# Patient Record
Sex: MALE | Race: WHITE | Employment: OTHER | URBAN - METROPOLITAN AREA
[De-identification: names, ages, dates, MRNs, and addresses within clinical notes are randomized per-mention and may not be internally consistent; named-entity substitution may affect disease eponyms.]

---

## 2017-01-18 DIAGNOSIS — I25.10 ATHEROSCLEROSIS OF NATIVE CORONARY ARTERY WITHOUT ANGINA PECTORIS, UNSPECIFIED WHETHER NATIVE OR TRANSPLANTED HEART: Primary | ICD-10-CM

## 2017-01-18 NOTE — TELEPHONE ENCOUNTER
Requested Prescriptions     Pending Prescriptions Disp Refills    nitroglycerin (NITROSTAT) 0.4 mg SL tablet 25 Tab 0     Si Tab by SubLINGual route every five (5) minutes as needed for up to 3 doses.      Please sign

## 2017-01-19 ENCOUNTER — OFFICE VISIT (OUTPATIENT)
Dept: CARDIOLOGY CLINIC | Age: 67
End: 2017-01-19

## 2017-01-19 VITALS
DIASTOLIC BLOOD PRESSURE: 77 MMHG | HEIGHT: 74 IN | BODY MASS INDEX: 27.72 KG/M2 | HEART RATE: 78 BPM | WEIGHT: 216 LBS | SYSTOLIC BLOOD PRESSURE: 169 MMHG

## 2017-01-19 DIAGNOSIS — I25.119 ATHEROSCLEROSIS OF NATIVE CORONARY ARTERY OF NATIVE HEART WITH ANGINA PECTORIS (HCC): Primary | ICD-10-CM

## 2017-01-19 DIAGNOSIS — I10 ESSENTIAL HYPERTENSION, BENIGN: ICD-10-CM

## 2017-01-19 DIAGNOSIS — I70.213 ATHEROSCLEROSIS OF NATIVE ARTERY OF BOTH LOWER EXTREMITIES WITH INTERMITTENT CLAUDICATION (HCC): ICD-10-CM

## 2017-01-19 DIAGNOSIS — I65.09 VERTEBRAL ARTERY STENOSIS, UNSPECIFIED LATERALITY: ICD-10-CM

## 2017-01-19 DIAGNOSIS — I95.1 ORTHOSTATIC HYPOTENSION: ICD-10-CM

## 2017-01-19 DIAGNOSIS — Z98.61 POSTSURGICAL PERCUTANEOUS TRANSLUMINAL CORONARY ANGIOPLASTY STATUS: ICD-10-CM

## 2017-01-19 DIAGNOSIS — E78.5 HYPERLIPIDEMIA, UNSPECIFIED HYPERLIPIDEMIA TYPE: ICD-10-CM

## 2017-01-19 RX ORDER — SERTRALINE HYDROCHLORIDE 50 MG/1
TABLET, FILM COATED ORAL 2 TIMES DAILY
COMMUNITY

## 2017-01-19 RX ORDER — ACETAMINOPHEN 325 MG/1
650 TABLET ORAL
COMMUNITY

## 2017-01-19 RX ORDER — NITROGLYCERIN 0.4 MG/1
0.4 TABLET SUBLINGUAL
Qty: 25 TAB | Refills: 1 | Status: SHIPPED | OUTPATIENT
Start: 2017-01-19 | End: 2017-04-27 | Stop reason: SDUPTHER

## 2017-01-19 RX ORDER — MIRTAZAPINE 30 MG/1
30 TABLET, FILM COATED ORAL
COMMUNITY

## 2017-01-19 RX ORDER — NITROGLYCERIN 0.4 MG/1
0.4 TABLET SUBLINGUAL
Qty: 25 TAB | Refills: 0 | OUTPATIENT
Start: 2017-01-19

## 2017-01-19 NOTE — MR AVS SNAPSHOT
Visit Information Date & Time Provider Department Dept. Phone Encounter #  
 1/19/2017 10:00 AM Trudy Martinez MD [facility] [phone] Follow-up Instructions Return for F/u after tests. Your Appointments 1/31/2017  8:30 AM  
PROCEDURE with [place] NUC [facility] ([facility]) Appt Note: Kirstin/Echo/Harris [address] [facility] [address]  
  
   
 [address] [id number] [address] [address]  
  
    
 1/31/2017  8:45 AM  
ECHO CARDIOGRAMS 2D with [place] ECHO [facility] ([facility]) Appt Note: [facility] [address] [place] [address] [address]  
  
   
 [address] [facility]  
  
    
 2/3/2017  1:30 PM  
Follow Up with Trudy Martinez MD  
[facility] ([facility]) Appt Note: Post nuclear/echo/Arterial doppler at [facility] [address] [facility] [address]  
  
   
 [address] [id number] [address] [address] Upcoming Health Maintenance Date Due Hepatitis C Screening 1950 FOBT Q 1 YEAR AGE 50-75 7/7/2000 ZOSTER VACCINE AGE 60> 7/7/2010 GLAUCOMA SCREENING Q2Y 7/7/2015 Pneumococcal 65+ Low/Medium Risk (1 of 2 - PCV13) 7/7/2015 MEDICARE YEARLY EXAM 7/7/2015 INFLUENZA AGE 9 TO ADULT 8/1/2016 DTaP/Tdap/Td series (2 - Td) 9/6/2022 Allergies as of 1/19/2017  Review Complete On: 1/19/2017 By: Trudy Martinez MD  
  
 Severity Noted Reaction Type Reactions Codeine  09/06/2012    Other (comments) States keeps him awake Current Immunizations  Never Reviewed Name Date TDAP Vaccine 9/6/2012 11:06 AM  
  
 Not reviewed this visit You Were Diagnosed With   
  
 Codes Comments Atherosclerosis of native coronary artery of native heart with angina pectoris ([facility] [address]    -  Primary ICD-10-CM: I25.119 ICD-9-CM: 414.01, 413.9 stable 
exertional angina Essential hypertension, benign     ICD-10-CM: I10 
ICD-9-CM: 401.1 elevated 
has orthostatic hypotension Postsurgical percutaneous transluminal coronary angioplasty status     ICD-10-CM: Z98.61 ICD-9-CM: V45.82 Vertebral artery stenosis, unspecified laterality     ICD-10-CM: I65.09 
ICD-9-CM: 433.20 Hyperlipidemia, unspecified hyperlipidemia type     ICD-10-CM: E78.5 ICD-9-CM: 272.4 stable Orthostatic hypotension     ICD-10-CM: I95.1 ICD-9-CM: 458.0 Atherosclerosis of native artery of both lower extremities with intermittent claudication ([facility] [address]     ICD-10-CM: J34.676 ICD-9-CM: 440.21 Vitals BP Pulse Height(growth percentile) Weight(growth percentile) BMI Smoking Status 169/77 78 6' 2\" (1.88 m) 216 lb (98 kg) 27.73 kg/m2 Former Smoker Vitals History BMI and BSA Data Body Mass Index Body Surface Area  
 27.73 kg/m 2 2.26 m 2 Preferred Pharmacy Pharmacy Name Phone [facility] [address] [place] [address] AT [facility] [phone] Your Updated Medication List  
  
   
This list is accurate as of: 1/19/17 10:33 AM.  Always use your most recent med list.  
  
  
  
  
 acetaminophen 325 mg tablet Commonly known as:  TYLENOL Take  by mouth every four (4) hours as needed for Pain. amantadine HCl 100 mg capsule Commonly known as:  Vicki Vernell Take  by mouth two (2) times a day. aspirin delayed-release 81 mg tablet Take  by mouth daily. ATIVAN 1 mg tablet Generic drug:  LORazepam  
Take 2 mg by mouth every eight (8) hours as needed. atorvastatin 80 mg tablet Commonly known as:  LIPITOR Take 80 mg by mouth daily. AVODART 0.5 mg capsule Generic drug:  dutasteride Take 0.5 mg by mouth daily. carbidopa-levodopa-entacapone 37.5-150-200 mg tablet Commonly known as:  STALEVO Take 1 Tab by mouth four (4) times daily. clopidogrel 75 mg Tab Commonly known as:  PLAVIX Take 1 Tab by mouth daily. Comp. Stocking,Thigh,Reg,Medium Misc  
1 Units by Does Not Apply route daily. fludrocortisone 0.1 mg tablet Commonly known as:  FLORINEF Take  by mouth daily. NEURONTIN 300 mg capsule Generic drug:  gabapentin Take 300 mg by mouth three (3) times daily. * nitroglycerin 0.4 mg SL tablet Commonly known as:  NITROSTAT  
1 Tab by SubLINGual route every five (5) minutes as needed for up to 3 doses. * nitroglycerin 0.4 mg SL tablet Commonly known as:  NITROSTAT  
1 Tab by SubLINGual route every five (5) minutes as needed for Chest Pain. REMERON 30 mg tablet Generic drug:  mirtazapine Take  by mouth nightly. ZOLOFT 50 mg tablet Generic drug:  sertraline Take  by mouth daily. * Notice: This list has 2 medication(s) that are the same as other medications prescribed for you. Read the directions carefully, and ask your doctor or other care provider to review them with you. Prescriptions Sent to Pharmacy Refills  
 nitroglycerin (NITROSTAT) 0.4 mg SL tablet 1 Si Tab by SubLINGual route every five (5) minutes as needed for Chest Pain. Class: Normal  
 Pharmacy: [facility] [address] [place] [address] [address] Ph #: [phone] Route: SubLINGual  
  
We Performed the Following DUPLEX LO EXTREM ART BILAT [id number] CPT(R)] Follow-up Instructions Return for F/u after tests. To-Do List   
 2017 Cardiac Services:  2D ECHO COMPLETE ADULT (TTE)   
  
 2017 Nursing:  SCHEDULE NUCLEAR STUDY Patient Instructions Patient is scheduled for a Art Doppler at [facility] on 17 @ 12:30 Introducing [facility]! Abdirashid Caldwell introduces [employer] patient portal. Now you can access parts of your medical record, email your doctor's office, and request medication refills online. 1. In your internet browser, go to [email or web address] 2. Click on the First Time User? Click Here link in the Sign In box. You will see the New Member Sign Up page. 3. Enter your [employer] Access Code exactly as it appears below. You will not need to use this code after youve completed the sign-up process. If you do not sign up before the expiration date, you must request a new code. · [employer] Access Code: [email or web address] Expires: 4/19/2017  9:49 AM 
 
4. Enter the last four digits of your Social Security Number (xxxx) and Date of Birth (mm/dd/yyyy) as indicated and click Submit. You will be taken to the next sign-up page. 5. Create a [employer] ID. This will be your [employer] login ID and cannot be changed, so think of one that is secure and easy to remember. 6. Create a [employer] password. You can change your password at any time. 7. Enter your Password Reset Question and Answer. This can be used at a later time if you forget your password. 8. Enter your e-mail address. You will receive e-mail notification when new information is available in [address] 9. Click Sign Up. You can now view and download portions of your medical record. 10. Click the Download Summary menu link to download a portable copy of your medical information. If you have questions, please visit the Frequently Asked Questions section of the [employer] website. Remember, [employer] is NOT to be used for urgent needs. For medical emergencies, dial 911. Now available from your iPhone and Android! Please provide this summary of care documentation to your next provider. Your primary care clinician is listed as [facility] ALLEN. If you have any questions after today's visit, please call [phone].

## 2017-01-19 NOTE — PROGRESS NOTES
1. Have you been to the ER, urgent care clinic since your last visit? Hospitalized since your last visit? No    2. Have you seen or consulted any other health care providers outside of the [facility] since your last visit? Include any pap smears or colon screening. Yes Where: /Dr. Charlotte Phan     3. Since your last visit, have you had any of the following symptoms? chest pains, shortness of breath and dizziness. 4.  Have you had any blood work, X-rays or cardiac testing? No       5. Where do you normally have your labs drawn? 6. Do you need any refills today?   no

## 2017-01-19 NOTE — PROGRESS NOTES
HISTORY OF PRESENT ILLNESS  Aaron Mena is a 77 y.o. male. HPI Comments: Patient with cad,old pci,hyperlipidemia. On follow up patient denies any chest pains, palpitation or other significant symptoms. He has sob on exertion. Improving    Chest Pain (Angina)    The history is provided by the patient. This is a new problem. The current episode started more than 1 week ago. The problem has not changed since onset. The problem occurs every several days. The pain is associated with exertion. The pain is present in the substernal region. The pain is mild. The quality of the pain is described as pressure-like. The pain does not radiate. Associated symptoms include dizziness and shortness of breath. Pertinent negatives include no abdominal pain, no claudication, no cough, no fever, no headaches, no hemoptysis, no nausea, no orthopnea, no palpitations, no PND, no sputum production, no vomiting and no weakness. Hypertension   The history is provided by the patient. This is a chronic problem. The problem occurs constantly. The problem has not changed since onset. Associated symptoms include chest pain and shortness of breath. Pertinent negatives include no abdominal pain and no headaches. Cholesterol Problem   The history is provided by the patient. This is a chronic problem. The problem occurs constantly. The problem has not changed since onset. Associated symptoms include chest pain and shortness of breath. Pertinent negatives include no abdominal pain and no headaches. Shortness of Breath   The history is provided by the patient. This is a chronic problem. The problem occurs intermittently. The problem has been gradually improving. Associated symptoms include chest pain. Pertinent negatives include no fever, no headaches, no cough, no sputum production, no hemoptysis, no wheezing, no PND, no orthopnea, no vomiting, no abdominal pain, no rash, no leg swelling and no claudication.    Dizziness   The history is provided by the patient. This is a chronic problem. The problem occurs every several days. The problem has been gradually improving. Associated symptoms include chest pain and shortness of breath. Pertinent negatives include no abdominal pain and no headaches. Review of Systems   Constitutional: Negative for chills and fever. HENT: Negative for nosebleeds. Eyes: Negative for blurred vision and double vision. Respiratory: Positive for shortness of breath. Negative for cough, hemoptysis, sputum production and wheezing. Cardiovascular: Positive for chest pain. Negative for palpitations, orthopnea, claudication, leg swelling and PND. Gastrointestinal: Negative for abdominal pain, heartburn, nausea and vomiting. Musculoskeletal: Negative for myalgias. Skin: Negative for rash. Neurological: Positive for dizziness, tingling and loss of consciousness. Negative for weakness and headaches. Endo/Heme/Allergies: Does not bruise/bleed easily. Family History   Problem Relation Age of Onset    Heart Disease Other        Past Medical History   Diagnosis Date    CAD (coronary artery disease)     Coronary atherosclerosis of native coronary artery 3/7/2014     stable  old pci sob on exertion. r/o ischemia or cmp     Essential hypertension, benign 3/7/2014     old pci's     Hypotension, unspecified 3/7/2014     old pci's     Other and unspecified hyperlipidemia 3/7/2014     old pci's     Parkinson disease ([facility] [address] 3/7/2014     old pci's     Parkinson's disease ([facility] [address]     Postsurgical percutaneous transluminal coronary angioplasty status 3/7/2014     old pci's        Past Surgical History   Procedure Laterality Date    Pr cardiac surg procedure unlist       stents    Hx other surgical       deep brain stimulator    Hx hernia repair Left 12/3/13     Riblet    Hx cervical fusion         Social History   Substance Use Topics    Smoking status: Former Smoker     Quit date: 2/8/2016    Smokeless tobacco: Never Used    Alcohol use Yes      Comment: occasional       Allergies   Allergen Reactions    Codeine Other (comments)     States keeps him awake       Current Outpatient Prescriptions   Medication Sig    mirtazapine (REMERON) 30 mg tablet Take  by mouth nightly.  acetaminophen (TYLENOL) 325 mg tablet Take  by mouth every four (4) hours as needed for Pain.  sertraline (ZOLOFT) 50 mg tablet Take  by mouth daily.  nitroglycerin (NITROSTAT) 0.4 mg SL tablet 1 Tab by SubLINGual route every five (5) minutes as needed for Chest Pain.  clopidogrel (PLAVIX) 75 mg tablet Take 1 Tab by mouth daily.  Comp. Stocking,Thigh,Reg,Medium misc 1 Units by Does Not Apply route daily.  nitroglycerin (NITROSTAT) 0.4 mg SL tablet 1 Tab by SubLINGual route every five (5) minutes as needed for up to 3 doses.  gabapentin (NEURONTIN) 300 mg capsule Take 300 mg by mouth three (3) times daily.  dutasteride (AVODART) 0.5 mg capsule Take 0.5 mg by mouth daily.  aspirin delayed-release 81 mg tablet Take  by mouth daily.  amantadine HCl (SYMMETREL) 100 mg capsule Take  by mouth two (2) times a day.  fludrocortisone (FLORINEF) 0.1 mg tablet Take  by mouth daily.  atorvastatin (LIPITOR) 80 mg tablet Take 80 mg by mouth daily.  LORazepam (ATIVAN) 1 mg tablet Take 2 mg by mouth every eight (8) hours as needed.  carbidopa-levodopa-entacapone (STALEVO) 37.5-150-200 mg tablet Take 1 Tab by mouth four (4) times daily. No current facility-administered medications for this visit. Visit Vitals    /77    Pulse 78    Ht 6' 2\" (1.88 m)    Wt 98 kg (216 lb)    BMI 27.73 kg/m2       Physical Exam   Constitutional: He is oriented to person, place, and time. He appears well-developed and well-nourished. HENT:   Head: Normocephalic and atraumatic. Eyes: Conjunctivae are normal.   Neck: Neck supple. No JVD present. No tracheal deviation present. No thyromegaly present.    Cardiovascular: Normal rate, regular rhythm and normal heart sounds. Exam reveals no gallop and no friction rub. No murmur heard. Pulmonary/Chest: Breath sounds normal. No respiratory distress. He has no wheezes. He has no rales. He exhibits no tenderness. Brain stimulator generator on rt side   Abdominal: Soft. There is no tenderness. Musculoskeletal: He exhibits no edema. Neurological: He is alert and oriented to person, place, and time. Skin: Skin is warm and dry. Psychiatric: He has a normal mood and affect. Mr. Jocelyne Guerrero has a reminder for a \"due or due soon\" health maintenance. I have asked that he contact his primary care provider for follow-up on this health maintenance. NUCLEAR IMAGING: 3/2014  Findings:   1. Post-stress imaging in short axis, horizontal and vertical long axis views reveals a mild defect in isotope uptake along inferobasal wall. 2. Resting images also show persistent defect in inferobasal wall without any normalization. 3. Gated images show normal left ventricular size, wall motion and systolic function. Ejection fraction is 50%. Diagnosis:   1. Probably normal scan. 2. Evidence of mild fixed defect of inferobasal wall noted with normal wall motion, likely suggestive of tissue attenuation. 3.   SUMMARY:echo:3/2014  Left ventricle: The ventricle was mildly dilated. Ejection fraction was  estimated to be 55 %. No obvious wall motion abnormalities identified in  the views obtained. There was mild concentric hypertrophy. Features were  consistent with a pseudonormal left ventricular filling pattern, with  concomitant abnormal relaxation and increased filling pressure (grade 2  diastolic dysfunction). Impression:2016    1. Extensive atherosclerotic vascular disease. High grade stenoses (greater than 75%) are suggested at the origins of both vertebral arteries.     2.  Heterogeneous plaque involving both carotid bifurcations with moderate stenoses involving both internal carotid artery origins, as described above. 3.  High-grade stenoses involving the right carotid siphon with moderate to high-grade stenoses in the left carotid siphon. 4.  Probable high-grade stenosis involving the distal P2 segment of the left posterior cerebral artery. Assessment       ICD-10-CM ICD-9-CM    1. Atherosclerosis of native coronary artery of native heart with angina pectoris (HCC) I25.119 414.01 SCHEDULE NUCLEAR STUDY     413.9 2D ECHO COMPLETE ADULT (TTE)    stable  exertional angina   2. Essential hypertension, benign I10 401.1     elevated  has orthostatic hypotension   3. Postsurgical percutaneous transluminal coronary angioplasty status Z98.61 V45.82    4. Vertebral artery stenosis, unspecified laterality I65.09 433.20    5. Hyperlipidemia, unspecified hyperlipidemia type E78.5 272.4     stable   6. Orthostatic hypotension I95.1 458.0    7. Atherosclerosis of native artery of both lower extremities with intermittent claudication (HCC) I70.213 440.21 DUPLEX LO EXTREM ART BILAT   will keep bp on high side due to frequent drop-on florinef-feels better    Medications Discontinued During This Encounter   Medication Reason    temazepam (RESTORIL) 30 mg capsule Not A Current Medication    venlafaxine-SR (EFFEXOR XR) 75 mg capsule Not A Current Medication    tamsulosin (FLOMAX) 0.4 mg capsule Not A Current Medication       Orders Placed This Encounter    DUPLEX LO EXTREM ART BILAT     Order Specific Question:   Reason for Exam:     Answer:   leg cramp    SCHEDULE NUCLEAR STUDY     lexiscan stress test     Standing Status:   Future     Standing Expiration Date:   2018    2D ECHO COMPLETE ADULT (TTE)     Standing Status:   Future     Standing Expiration Date:   2017     Order Specific Question:   Reason for Exam:     Answer:   see diagnosis    nitroglycerin (NITROSTAT) 0.4 mg SL tablet     Si Tab by SubLINGual route every five (5) minutes as needed for Chest Pain.      Dispense:  25 Tab Refill:  1       Follow-up Disposition:  Return for F/u after tests.

## 2017-01-27 ENCOUNTER — TELEPHONE (OUTPATIENT)
Dept: CARDIOLOGY CLINIC | Age: 67
End: 2017-01-27

## 2017-01-27 NOTE — TELEPHONE ENCOUNTER
Patient wife states he just got out of hospital d/t low blood pressure, and he was suppose to have a test done at hospital yesterday and had to cancel it. She want to know if he should reschedule his stress test and echo on Tuesday. She states BP is still running 154/98 and as low as 50/ 30 that's why he went to ER she states.  Please Advise

## 2017-01-30 NOTE — TELEPHONE ENCOUNTER
Patient schedule for echo/ stress test tomorrow do you want a same day follow up after tests or reschedule for another day.

## 2017-01-31 ENCOUNTER — OFFICE VISIT (OUTPATIENT)
Dept: CARDIOLOGY CLINIC | Age: 67
End: 2017-01-31

## 2017-01-31 ENCOUNTER — CLINICAL SUPPORT (OUTPATIENT)
Dept: CARDIOLOGY CLINIC | Age: 67
End: 2017-01-31

## 2017-01-31 VITALS
BODY MASS INDEX: 27.59 KG/M2 | DIASTOLIC BLOOD PRESSURE: 49 MMHG | HEIGHT: 74 IN | WEIGHT: 215 LBS | HEART RATE: 85 BPM | SYSTOLIC BLOOD PRESSURE: 105 MMHG

## 2017-01-31 DIAGNOSIS — I25.119 ATHEROSCLEROSIS OF NATIVE CORONARY ARTERY OF NATIVE HEART WITH ANGINA PECTORIS (HCC): ICD-10-CM

## 2017-01-31 DIAGNOSIS — R55 SYNCOPE, UNSPECIFIED SYNCOPE TYPE: ICD-10-CM

## 2017-01-31 DIAGNOSIS — I25.119 ATHEROSCLEROSIS OF NATIVE CORONARY ARTERY OF NATIVE HEART WITH ANGINA PECTORIS (HCC): Primary | ICD-10-CM

## 2017-01-31 DIAGNOSIS — Z98.61 POSTSURGICAL PERCUTANEOUS TRANSLUMINAL CORONARY ANGIOPLASTY STATUS: ICD-10-CM

## 2017-01-31 DIAGNOSIS — I10 ESSENTIAL HYPERTENSION, BENIGN: ICD-10-CM

## 2017-01-31 RX ORDER — TAMSULOSIN HYDROCHLORIDE 0.4 MG/1
0.4 CAPSULE ORAL DAILY
COMMUNITY
End: 2017-04-27

## 2017-01-31 NOTE — PROGRESS NOTES
1. Have you been to the ER, urgent care clinic since your last visit? Hospitalized since your last visit? Yes Where: [facility] Reason for visit: Syncope    2. Have you seen or consulted any other health care providers outside of the [address] [place] [address] since your last visit? Include any pap smears or colon screening. No     3. Since your last visit, have you had any of the following symptoms? .           4. Have you had any blood work, X-rays or cardiac testing? Yes Where: [facility]             5.  Where do you normally have your labs drawn? [facility]    6. Do you need any refills today?    No

## 2017-01-31 NOTE — MR AVS SNAPSHOT
Visit Information Date & Time Provider Department Dept. Phone Encounter #  
 1/31/2017 11:15 AM Christian Ortega MD [facility] [phone] [id number] Follow-up Instructions Return in about 3 months (around 4/30/2017). Your Appointments 4/27/2017  9:30 AM  
Follow Up with Christian Ortgea MD  
[facility] ([facility]) Appt Note: 3 month [address] [place] [address]  
  
   
 [address] [place] [address] Upcoming Health Maintenance Date Due Hepatitis C Screening 1950 FOBT Q 1 YEAR AGE 50-75 7/7/2000 ZOSTER VACCINE AGE 60> 7/7/2010 GLAUCOMA SCREENING Q2Y 7/7/2015 Pneumococcal 65+ Low/Medium Risk (1 of 2 - PCV13) 7/7/2015 MEDICARE YEARLY EXAM 7/7/2015 INFLUENZA AGE 9 TO ADULT 8/1/2016 DTaP/Tdap/Td series (2 - Td) 9/6/2022 Allergies as of 1/31/2017  Review Complete On: 1/31/2017 By: Christian Ortega MD  
  
 Severity Noted Reaction Type Reactions Codeine  09/06/2012    Other (comments) States keeps him awake Current Immunizations  Never Reviewed Name Date TDAP Vaccine 9/6/2012 11:06 AM  
  
 Not reviewed this visit You Were Diagnosed With   
  
 Codes Comments Atherosclerosis of native coronary artery of native heart with angina pectoris ([facility]    -  Primary ICD-10-CM: I25.119 ICD-9-CM: 414.01, 413.9 stable 
negative stress test  
 Essential hypertension, benign     ICD-10-CM: I10 
ICD-9-CM: 401.1 Postsurgical percutaneous transluminal coronary angioplasty status     ICD-10-CM: Z98.61 ICD-9-CM: V45.82 Syncope, unspecified syncope type     ICD-10-CM: R55 
ICD-9-CM: 780.2 recent admission 
new intracraniel arterial block 
awaiting interventional neuro evaluation with dr Gabriela Lemas BP Pulse Height(growth percentile) Weight(growth percentile) BMI Smoking Status 105/49 85 6' 2\" (1.88 m) 215 lb (97.5 kg) 27.6 kg/m2 Former Smoker Vitals History BMI and BSA Data Body Mass Index Body Surface Area  
 27.6 kg/m 2 2.26 m 2 Preferred Pharmacy Pharmacy Name Phone [facility] [address] [facility] AT [facility] [phone] Your Updated Medication List  
  
   
This list is accurate as of: 1/31/17 11:37 AM.  Always use your most recent med list.  
  
  
  
  
 acetaminophen 325 mg tablet Commonly known as:  TYLENOL Take  by mouth every four (4) hours as needed for Pain. amantadine HCl 100 mg capsule Commonly known as:  Behzad Mates Take  by mouth two (2) times a day. aspirin delayed-release 81 mg tablet Take  by mouth daily. ATIVAN 1 mg tablet Generic drug:  LORazepam  
Take 2 mg by mouth every eight (8) hours as needed. atorvastatin 80 mg tablet Commonly known as:  LIPITOR Take 40 mg by mouth daily. AVODART 0.5 mg capsule Generic drug:  dutasteride Take 0.5 mg by mouth daily. carbidopa-levodopa-entacapone 37.5-150-200 mg tablet Commonly known as:  STALEVO Take 1 Tab by mouth four (4) times daily. -200  
  
 clopidogrel 75 mg Tab Commonly known as:  PLAVIX Take 1 Tab by mouth daily. Comp. Stocking,Thigh,Reg,Medium Misc  
1 Units by Does Not Apply route daily. fludrocortisone 0.1 mg tablet Commonly known as:  FLORINEF Take  by mouth daily. NEURONTIN 300 mg capsule Generic drug:  gabapentin Take 300 mg by mouth three (3) times daily. nitroglycerin 0.4 mg SL tablet Commonly known as:  NITROSTAT  
1 Tab by SubLINGual route every five (5) minutes as needed for Chest Pain.  
  
 regadenoson 0.4 mg/5 mL Syrg injection Commonly known as:  LEXISCAN  
5 mL by IntraVENous route once for 1 dose. REMERON 30 mg tablet Generic drug:  mirtazapine Take  by mouth nightly. tamsulosin 0.4 mg capsule Commonly known as:  FLOMAX Take 0.4 mg by mouth daily. ZOLOFT 50 mg tablet Generic drug:  sertraline Take  by mouth daily. Prescriptions Printed Refills Comp. Stocking,Thigh,Reg,Medium misc 6 Si Units by Does Not Apply route daily. Class: Print Route: Does Not Apply Follow-up Instructions Return in about 3 months (around 2017). Introducing [facility]! [facility] introduces "[employer]" patient portal. Now you can access parts of your medical record, email your doctor's office, and request medication refills online. 1. In your internet browser, go to [email or web address] ([facility]). [employer]/[facility] ([facility]) 2. Click on the First Time User? Click Here link in the Sign In box. You will see the New Member Sign Up page. 3. Enter your "[employer]" Access Code exactly as it appears below. You will not need to use this code after youve completed the sign-up process. If you do not sign up before the expiration date, you must request a new code. · "[employer]" Access Code: [email or web address] Expires: 2017  9:49 AM 
 
4. Enter the last four digits of your Social Security Number (xxxx) and Date of Birth (mm/dd/yyyy) as indicated and click Submit. You will be taken to the next sign-up page. 5. Create a "[employer]" ID. This will be your "[employer]" login ID and cannot be changed, so think of one that is secure and easy to remember. 6. Create a "[employer]" password. You can change your password at any time. 7. Enter your Password Reset Question and Answer. This can be used at a later time if you forget your password. 8. Enter your e-mail address. You will receive e-mail notification when new information is available in [address] 9. Click Sign Up. You can now view and download portions of your medical record. 10. Click the Download Summary menu link to download a portable copy of your medical information.  
 
If you have questions, please visit the Frequently Asked Questions section of the [place]. Remember, [employer] is NOT to be used for urgent needs. For medical emergencies, dial 911. Now available from your iPhone and Android! Please provide this summary of care documentation to your next provider. Your primary care clinician is listed as [facility] ALL[facility]. If you have any questions after today's visit, please call [phone].

## 2017-01-31 NOTE — PROGRESS NOTES
HISTORY OF PRESENT ILLNESS  Tripp Hudson is a 77 y.o. male. HPI Comments: Patient with cad,old pci,hyperlipidemia. On follow up patient denies any chest pains, palpitation or other significant symptoms. He has sob on exertion. Improving  Recent admission with syncope-new neurological finding    Chest Pain (Angina)    The history is provided by the patient. This is a new problem. The current episode started more than 1 week ago. The problem has not changed since onset. The problem occurs every several days. The pain is associated with exertion. The pain is present in the substernal region. The pain is mild. The quality of the pain is described as pressure-like. The pain does not radiate. Associated symptoms include dizziness and shortness of breath. Pertinent negatives include no abdominal pain, no claudication, no cough, no fever, no headaches, no hemoptysis, no nausea, no orthopnea, no palpitations, no PND, no sputum production, no vomiting and no weakness. Hypertension   The history is provided by the patient. This is a chronic problem. The problem occurs constantly. The problem has not changed since onset. Associated symptoms include chest pain and shortness of breath. Pertinent negatives include no abdominal pain and no headaches. Cholesterol Problem   The history is provided by the patient. This is a chronic problem. The problem occurs constantly. The problem has not changed since onset. Associated symptoms include chest pain and shortness of breath. Pertinent negatives include no abdominal pain and no headaches. Shortness of Breath   The history is provided by the patient. This is a chronic problem. The problem occurs intermittently. The problem has been gradually improving. Associated symptoms include chest pain.  Pertinent negatives include no fever, no headaches, no cough, no sputum production, no hemoptysis, no wheezing, no PND, no orthopnea, no vomiting, no abdominal pain, no rash, no leg swelling and no claudication. Dizziness   The history is provided by the patient. This is a chronic problem. The problem occurs every several days. The problem has been gradually improving. Associated symptoms include chest pain and shortness of breath. Pertinent negatives include no abdominal pain and no headaches. Review of Systems   Constitutional: Negative for chills and fever. HENT: Negative for nosebleeds. Eyes: Negative for blurred vision and double vision. Respiratory: Positive for shortness of breath. Negative for cough, hemoptysis, sputum production and wheezing. Cardiovascular: Positive for chest pain. Negative for palpitations, orthopnea, claudication, leg swelling and PND. Gastrointestinal: Negative for abdominal pain, heartburn, nausea and vomiting. Musculoskeletal: Negative for myalgias. Skin: Negative for rash. Neurological: Positive for dizziness, tingling and loss of consciousness. Negative for weakness and headaches. Endo/Heme/Allergies: Does not bruise/bleed easily. Family History   Problem Relation Age of Onset    Heart Disease Other        Past Medical History   Diagnosis Date    CAD (coronary artery disease)     Coronary atherosclerosis of native coronary artery 3/7/2014     stable  old pci sob on exertion. r/o ischemia or cmp     Essential hypertension, benign 3/7/2014     old pci's     Hypotension, unspecified 3/7/2014     old pci's     Other and unspecified hyperlipidemia 3/7/2014     old pci's     Parkinson disease ([address] 3/7/2014     old pci's     Parkinson's disease ([address]     Postsurgical percutaneous transluminal coronary angioplasty status 3/7/2014     old pci's        Past Surgical History   Procedure Laterality Date    Pr cardiac surg procedure unlist       stents    Hx other surgical       deep brain stimulator    Hx hernia repair Left 12/3/13     R[place]t    Hx cervical fusion         Social History   Substance Use Topics    Smoking status: Former Smoker     Quit date: 2/8/2016    Smokeless tobacco: Never Used    Alcohol use Yes      Comment: occasional       Allergies   Allergen Reactions    Codeine Other (comments)     States keeps him awake       Current Outpatient Prescriptions   Medication Sig    tamsulosin (FLOMAX) 0.4 mg capsule Take 0.4 mg by mouth daily.  Comp. Stocking,Thigh,Reg,Medium misc 1 Units by Does Not Apply route daily.  mirtazapine (REMERON) 30 mg tablet Take  by mouth nightly.  acetaminophen (TYLENOL) 325 mg tablet Take  by mouth every four (4) hours as needed for Pain.  sertraline (ZOLOFT) 50 mg tablet Take  by mouth daily.  nitroglycerin (NITROSTAT) 0.4 mg SL tablet 1 Tab by SubLINGual route every five (5) minutes as needed for Chest Pain.  clopidogrel (PLAVIX) 75 mg tablet Take 1 Tab by mouth daily.  gabapentin (NEURONTIN) 300 mg capsule Take 300 mg by mouth three (3) times daily.  dutasteride (AVODART) 0.5 mg capsule Take 0.5 mg by mouth daily.  aspirin delayed-release 81 mg tablet Take  by mouth daily.  amantadine HCl (SYMMETREL) 100 mg capsule Take  by mouth two (2) times a day.  fludrocortisone (FLORINEF) 0.1 mg tablet Take  by mouth daily.  atorvastatin (LIPITOR) 80 mg tablet Take 40 mg by mouth daily.  LORazepam (ATIVAN) 1 mg tablet Take 2 mg by mouth every eight (8) hours as needed.  carbidopa-levodopa-entacapone (STALEVO) 37.5-150-200 mg tablet Take 1 Tab by mouth four (4) times daily. -200    regadenoson (LEXISCAN) 0.4 mg/5 mL syrg injection 5 mL by IntraVENous route once for 1 dose. No current facility-administered medications for this visit. Visit Vitals    /49    Pulse 85    Ht 6' 2\" (1.88 m)    Wt 97.5 kg (215 lb)    BMI 27.6 kg/m2       Physical Exam   Constitutional: He is oriented to person, place, and time. He appears well-developed and well-nourished. HENT:   Head: Normocephalic and atraumatic.    Eyes: Conjunctivae are normal.   Neck: Neck supple. No JVD present. No tracheal deviation present. No thyromegaly present. Cardiovascular: Normal rate, regular rhythm and normal heart sounds. Exam reveals no gallop and no friction rub. No murmur heard. Pulmonary/Chest: Breath sounds normal. No respiratory distress. He has no wheezes. He has no rales. He exhibits no tenderness. Brain stimulator generator on rt side   Abdominal: Soft. There is no tenderness. Musculoskeletal: He exhibits no edema. Neurological: He is alert and oriented to person, place, and time. Skin: Skin is warm and dry. Psychiatric: He has a normal mood and affect. Mr. Benjamín Camp has a reminder for a \"due or due soon\" health maintenance. I have asked that he contact his primary care provider for follow-up on this health maintenance. NUCLEAR IMAGING: 3/2014  Findings:   1. Post-stress imaging in short axis, horizontal and vertical long axis views reveals a mild defect in isotope uptake along inferobasal wall. 2. Resting images also show persistent defect in inferobasal wall without any normalization. 3. Gated images show normal left ventricular size, wall motion and systolic function. Ejection fraction is 50%. Diagnosis:   1. Probably normal scan. 2. Evidence of mild fixed defect of inferobasal wall noted with normal wall motion, likely suggestive of tissue attenuation. 3.   SUMMARY:echo:3/2014  Left ventricle: The ventricle was mildly dilated. Ejection fraction was  estimated to be 55 %. No obvious wall motion abnormalities identified in  the views obtained. There was mild concentric hypertrophy. Features were  consistent with a pseudonormal left ventricular filling pattern, with  concomitant abnormal relaxation and increased filling pressure (grade 2  diastolic dysfunction). Impression:2016    1. Extensive atherosclerotic vascular disease. High grade stenoses (greater than 75%) are suggested at the origins of both vertebral arteries.     2.  Heterogeneous plaque involving both carotid bifurcations with moderate stenoses involving both internal carotid artery origins, as described above. 3.  High-grade stenoses involving the right carotid siphon with moderate to high-grade stenoses in the left carotid siphon. 4.  Probable high-grade stenosis involving the distal P2 segment of the left posterior cerebral artery. cta-2017  CTA NECK: Unchanged since 16.              SANCHEZ:  Critical intracranial atherosclerotic stenosis.  Extracranial stenosis not well characterized, perhaps 50-70%.           LICA:  High-grade intracranial atherosclerotic stenosis.   ~30-40% extracranial stenosis.           RVA:  Origin stenosis, at least moderate.              LVA:  Critical origin stenosis.           VERTEBROBASILAR COLLATERAL SUPPORT: Small bilateral posterior communicating arteries.       CTA BRAIN:  Significant right MCA change since 16.              1.  New high-grade right MCA origin stenosis.           2.  Unchanged high-grade left PCA P2 stenosis.           3.  No aneurysm or large vessel occlusion. Echo-2017   INCREASED LEFT VENTRICULAR CAVITY SIZE WITH MILD CONCENTRIC LEFT VENTRICULAR HYPERTROPHY. NORMAL GLOBAL LEFT VENTRICULAR SYSTOLIC FUNCTION WITH AN ESTIMATED EJECTION FRACTION OF   55%. MILD DIASTOLIC DYSFUNCTION. NORMAL PULMONARY ARTERY PRESSURE OF 11 MMHG. MILDLY DILATED LEFT ATRIUM. TRACE MITRAL AND TRICUSPID REGURGITATION. NO EVIDENCE OF AORTIC OR PULMONIC REGURGITATION. COMPARED WITH PREVIOUS REPORT DATED 2016, THE EJECTION FRACTION HAS INCREASED FROM 45%   TO 55%. NUCLEAR IMAGIN2017    Findings:   1. Stress images reveal normal Myoview distrubution in all the LV segments in short axis, vertical and horizontal long axis views. 2. Resting images have a normal uptake. 3. Gated images reveal normal wall motion and the ejection fraction is calculated to be 62%. Conclusion:   1. Normal perfusion scan.    2. Normal wall motion and ejection fraction. 3. Low risk scan. Assessment       ICD-10-CM ICD-9-CM    1. Atherosclerosis of native coronary artery of native heart with angina pectoris (HCC) I25.119 414.01      413.9     stable  negative stress test   2. Essential hypertension, benign I10 401.1    3. Postsurgical percutaneous transluminal coronary angioplasty status Z98.61 V45.82    4. Syncope, unspecified syncope type R55 780.2     recent admission  new intracraniel arterial block  awaiting interventional neuro evaluation with dr Vladimir Camejo   will keep bp on high side due to frequent drop-on florinef-feels better    Medications Discontinued During This Encounter   Medication Reason    nitroglycerin (NITROSTAT) 0.4 mg SL tablet Duplicate Order    Comp. Stocking,Thigh,Reg,Medium misc Reorder       Orders Placed This Encounter    Comp. Stocking,Thigh,Reg,Medium misc     Si Units by Does Not Apply route daily. Dispense:  1 Units     Refill:  6       Follow-up Disposition:  Return in about 3 months (around 2017).

## 2017-01-31 NOTE — PROGRESS NOTES
[facility]  [address], [address] [place] [address], [place], [address] [place] [address]  [phone] [place]  [phone] [place]       Name: Moy Lopez         MRN#: [id number]        YOB: 1950   Gender: male Ht:6'2\" Wt:216lbs       . Date of Rest/Stress Images: 1/31/2017   Referring Physician: Lluvia Noriega MD  Ordering Physician: Hilaria Kussmaul. Brigid Phipps MD, [facility]  Technologist: Delma Vaz. TANGELA Miller, C.N.M.T  Diagnosis:  1. Atherosclerosis of native coronary artery of native heart with angina pectoris ([address]    2. Essential hypertension, benign    3. Postsurgical percutaneous transluminal coronary angioplasty status            Rest/Stress Myoview SPECT Myocardial Perfusion Imaging with  Lexiscan Stress and gated SPECT Imaging      PROCEDURE:        Myocardial perfusion imaging was performed at rest approximately 30 mins following the intravenous injection,(Right hand ) of 12.2 mCi of Tc99m Myoview for evaluation of myocardial function and perfusion at rest.    Baseline Data:    Baseline EKG reveals sinus rhythm, nonspecific ST-T changes. Baseline heart rate is 78. Baseline blood pressure is 162/84. Procedure: The patient was injected with 0.4 mg IV Lexiscan according to protocol. The patient had chest tightness during the procedure and received 100 mg of IV Aminophylline with relief. Heart rate increased from baseline to a heart rate of 84. Blood pressure decreased to 142/76. Electrocardiogram showed nonspecific ST-T changes during the procedure. No significant arrhythmias are noted. Diagnosis:   1. Nondiagnostic EKG portion of Lexiscan stress test.    2. Nuclear imaging report to follow. Pharmacological:  Patient was injected with . 4 mg/mL with Lexiscan intravenously over a period 10 to 20 sec. After pharmacologic stress, the patient was injected intravenously with 38.8 mCi of Tc99m Myoview.  Gating post stress tomographic imaging performed approximately 45 minutes post tracer injection. The data was reconstructed in the short, horizontal long and vertical long axis views and tomographic slices were generated. NUCLEAR IMAGING:    Findings:   1. Stress images reveal normal Myoview distrubution in all the LV segments in short axis, vertical and horizontal long axis views. 2. Resting images have a normal uptake. 3. Gated images reveal normal wall motion and the ejection fraction is calculated to be 62%. Conclusion:   1. Normal perfusion scan. 2. Normal wall motion and ejection fraction. 3. Low risk scan. Thank you for the referral.    E-signed and Interpreting Physician:    Marcelino Calvo MD, [facility]     Date of interpretation: 1/31/2017  Date of final report: 1/31/2017

## 2017-02-07 ENCOUNTER — TELEPHONE (OUTPATIENT)
Dept: CARDIOLOGY CLINIC | Age: 67
End: 2017-02-07

## 2017-02-07 NOTE — TELEPHONE ENCOUNTER
Patient was given a Rx for compression stockings and the [facility] is calling to see what compression patient needs and if it is for Both legs or right or left. Please Advise.

## 2017-02-07 NOTE — TELEPHONE ENCOUNTER
Please check if patient has edema in  both leg usually compression can be use for both LE. This the order from Dr. Alphonse Zuniga. Stocking,Thigh,Reg,Medium misc

## 2017-04-27 ENCOUNTER — OFFICE VISIT (OUTPATIENT)
Dept: CARDIOLOGY CLINIC | Age: 67
End: 2017-04-27

## 2017-04-27 VITALS
BODY MASS INDEX: 27.59 KG/M2 | WEIGHT: 215 LBS | HEART RATE: 73 BPM | SYSTOLIC BLOOD PRESSURE: 145 MMHG | DIASTOLIC BLOOD PRESSURE: 72 MMHG | HEIGHT: 74 IN

## 2017-04-27 DIAGNOSIS — I25.119 ATHEROSCLEROSIS OF NATIVE CORONARY ARTERY OF NATIVE HEART WITH ANGINA PECTORIS (HCC): Primary | ICD-10-CM

## 2017-04-27 DIAGNOSIS — Z98.61 POSTSURGICAL PERCUTANEOUS TRANSLUMINAL CORONARY ANGIOPLASTY STATUS: ICD-10-CM

## 2017-04-27 DIAGNOSIS — I95.1 ORTHOSTATIC HYPOTENSION: ICD-10-CM

## 2017-04-27 DIAGNOSIS — I65.09 VERTEBRAL ARTERY STENOSIS, UNSPECIFIED LATERALITY: ICD-10-CM

## 2017-04-27 DIAGNOSIS — I10 ESSENTIAL HYPERTENSION, BENIGN: ICD-10-CM

## 2017-04-27 RX ORDER — FLUDROCORTISONE ACETATE 0.1 MG/1
0.1 TABLET ORAL DAILY
Qty: 30 TAB | Refills: 6 | Status: SHIPPED | OUTPATIENT
Start: 2017-04-27 | End: 2017-07-17 | Stop reason: SDUPTHER

## 2017-04-27 RX ORDER — ACETAMINOPHEN, DIPHENHYDRAMINE HCL, PHENYLEPHRINE HCL 325; 25; 5 MG/1; MG/1; MG/1
10 TABLET ORAL DAILY
COMMUNITY

## 2017-04-27 RX ORDER — CEPHALEXIN 500 MG/1
500 CAPSULE ORAL 2 TIMES DAILY
COMMUNITY

## 2017-04-27 RX ORDER — NITROGLYCERIN 0.4 MG/1
0.4 TABLET SUBLINGUAL
Qty: 25 TAB | Refills: 1 | Status: SHIPPED | OUTPATIENT
Start: 2017-04-27 | End: 2017-08-14 | Stop reason: SDUPTHER

## 2017-04-27 NOTE — PROGRESS NOTES
HISTORY OF PRESENT ILLNESS  Eladio Vasques is a 77 y.o. male. HPI Comments: Patient with cad,old pci,hyperlipidemia. On follow up patient denies any chest pains, palpitation or other significant symptoms. He has sob on exertion. Improving  Recent admission with syncope-new neurological finding    Hypertension   The history is provided by the patient. This is a chronic problem. The problem occurs constantly. The problem has not changed since onset. Associated symptoms include chest pain and shortness of breath. Pertinent negatives include no abdominal pain and no headaches. Chest Pain (Angina)    The history is provided by the patient. This is a new problem. The current episode started more than 1 week ago. The problem has not changed since onset. The problem occurs every several days. The pain is associated with exertion. The pain is present in the substernal region. The pain is mild. The quality of the pain is described as pressure-like. The pain does not radiate. Associated symptoms include dizziness and shortness of breath. Pertinent negatives include no abdominal pain, no claudication, no cough, no fever, no headaches, no hemoptysis, no nausea, no orthopnea, no palpitations, no PND, no sputum production, no vomiting and no weakness. Cholesterol Problem   The history is provided by the patient. This is a chronic problem. The problem occurs constantly. The problem has not changed since onset. Associated symptoms include chest pain and shortness of breath. Pertinent negatives include no abdominal pain and no headaches. Shortness of Breath   The history is provided by the patient. This is a chronic problem. The problem occurs intermittently. The problem has been gradually improving. Associated symptoms include chest pain.  Pertinent negatives include no fever, no headaches, no cough, no sputum production, no hemoptysis, no wheezing, no PND, no orthopnea, no vomiting, no abdominal pain, no rash, no leg swelling and no claudication. Dizziness   The history is provided by the patient. This is a chronic problem. The problem occurs every several days. The problem has not changed since onset. Associated symptoms include chest pain and shortness of breath. Pertinent negatives include no abdominal pain and no headaches. The symptoms are aggravated by exertion and standing. Nothing relieves the symptoms. Review of Systems   Constitutional: Negative for chills and fever. HENT: Negative for nosebleeds. Eyes: Negative for blurred vision and double vision. Respiratory: Positive for shortness of breath. Negative for cough, hemoptysis, sputum production and wheezing. Cardiovascular: Positive for chest pain. Negative for palpitations, orthopnea, claudication, leg swelling and PND. Gastrointestinal: Negative for abdominal pain, heartburn, nausea and vomiting. Musculoskeletal: Negative for myalgias. Skin: Negative for rash. Neurological: Positive for dizziness, tingling and loss of consciousness. Negative for weakness and headaches. Endo/Heme/Allergies: Does not bruise/bleed easily. Family History   Problem Relation Age of Onset    Heart Disease Other        Past Medical History:   Diagnosis Date    CAD (coronary artery disease)     Coronary atherosclerosis of native coronary artery 3/7/2014    stable  old pci sob on exertion. r/o ischemia or cmp     Essential hypertension, benign 3/7/2014    old pci's     Hypotension, unspecified 3/7/2014    old pci's     Other and unspecified hyperlipidemia 3/7/2014    old pci's     Parkinson disease ([address] 3/7/2014    old pci's     Parkinson's disease ([address]     Postsurgical percutaneous transluminal coronary angioplasty status 3/7/2014    old pci's        Past Surgical History:   Procedure Laterality Date    CARDIAC SURG PROCEDURE UNLIST      stents    HX CERVICAL FUSION      HX HERNIA REPAIR Left 12/3/13    [facility]    HX OTHER SURGICAL      deep brain stimulator Social History   Substance Use Topics    Smoking status: Former Smoker     Quit date: 2/8/2016    Smokeless tobacco: Never Used    Alcohol use Yes      Comment: occasional       Allergies   Allergen Reactions    Codeine Other (comments)     States keeps him awake       Current Outpatient Prescriptions   Medication Sig    cephALEXin (KEFLEX) 500 mg capsule Take 500 mg by mouth two (2) times a day.  melatonin 10 mg tab Take  by mouth.  fish oil-omega-3 fatty acids 340-1,000 mg capsule Take 1 Cap by mouth daily.  fludrocortisone (FLORINEF) 0.1 mg tablet Take 1 Tab by mouth daily.  nitroglycerin (NITROSTAT) 0.4 mg SL tablet 1 Tab by SubLINGual route every five (5) minutes as needed for Chest Pain.  clopidogrel (PLAVIX) 75 mg tab TAKE 1 TABLET BY MOUTH EVERY DAY    Comp. Stocking,Thigh,Reg,Medium misc 1 Units by Does Not Apply route daily.  mirtazapine (REMERON) 30 mg tablet Take  by mouth nightly.  acetaminophen (TYLENOL) 325 mg tablet Take  by mouth every four (4) hours as needed for Pain.  sertraline (ZOLOFT) 50 mg tablet Take  by mouth daily.  gabapentin (NEURONTIN) 300 mg capsule Take 300 mg by mouth three (3) times daily.  dutasteride (AVODART) 0.5 mg capsule Take 0.5 mg by mouth daily.  aspirin delayed-release 81 mg tablet Take  by mouth daily.  amantadine HCl (SYMMETREL) 100 mg capsule Take  by mouth two (2) times a day.  atorvastatin (LIPITOR) 80 mg tablet Take 40 mg by mouth daily.  LORazepam (ATIVAN) 1 mg tablet Take 2 mg by mouth every eight (8) hours as needed.  carbidopa-levodopa-entacapone (STALEVO) 37.5-150-200 mg tablet Take 1 Tab by mouth four (4) times daily. -200     No current facility-administered medications for this visit. Visit Vitals    /72    Pulse 73    Ht 6' 2\" (1.88 m)    Wt 97.5 kg (215 lb)    BMI 27.6 kg/m2       Physical Exam   Constitutional: He is oriented to person, place, and time.  He appears well-developed and well-nourished. HENT:   Head: Normocephalic and atraumatic. Eyes: Conjunctivae are normal.   Neck: Neck supple. No JVD present. No tracheal deviation present. No thyromegaly present. Cardiovascular: Normal rate, regular rhythm and normal heart sounds. Exam reveals no gallop and no friction rub. No murmur heard. Pulmonary/Chest: Breath sounds normal. No respiratory distress. He has no wheezes. He has no rales. He exhibits no tenderness. Brain stimulator generator on rt side   Abdominal: Soft. There is no tenderness. Musculoskeletal: He exhibits no edema. Neurological: He is alert and oriented to person, place, and time. Skin: Skin is warm and dry. Psychiatric: He has a normal mood and affect. Mr. Yves Person has a reminder for a \"due or due soon\" health maintenance. I have asked that he contact his primary care provider for follow-up on this health maintenance. NUCLEAR IMAGING: 3/2014  Findings:   1. Post-stress imaging in short axis, horizontal and vertical long axis views reveals a mild defect in isotope uptake along inferobasal wall. 2. Resting images also show persistent defect in inferobasal wall without any normalization. 3. Gated images show normal left ventricular size, wall motion and systolic function. Ejection fraction is 50%. Diagnosis:   1. Probably normal scan. 2. Evidence of mild fixed defect of inferobasal wall noted with normal wall motion, likely suggestive of tissue attenuation. 3.   SUMMARY:echo:3/2014  Left ventricle: The ventricle was mildly dilated. Ejection fraction was  estimated to be 55 %. No obvious wall motion abnormalities identified in  the views obtained. There was mild concentric hypertrophy. Features were  consistent with a pseudonormal left ventricular filling pattern, with  concomitant abnormal relaxation and increased filling pressure (grade 2  diastolic dysfunction). Impression:2016    1.  Extensive atherosclerotic vascular disease. High grade stenoses (greater than 75%) are suggested at the origins of both vertebral arteries. 2.  Heterogeneous plaque involving both carotid bifurcations with moderate stenoses involving both internal carotid artery origins, as described above. 3.  High-grade stenoses involving the right carotid siphon with moderate to high-grade stenoses in the left carotid siphon. 4.  Probable high-grade stenosis involving the distal P2 segment of the left posterior cerebral artery. cta-2017  CTA NECK: Unchanged since 16.              SANCHEZ:  Critical intracranial atherosclerotic stenosis.  Extracranial stenosis not well characterized, perhaps 50-70%.           LICA:  High-grade intracranial atherosclerotic stenosis.   ~30-40% extracranial stenosis.           RVA:  Origin stenosis, at least moderate.              LVA:  Critical origin stenosis.           VERTEBROBASILAR COLLATERAL SUPPORT: Small bilateral posterior communicating arteries.       CTA BRAIN:  Significant right MCA change since 16.              1.  New high-grade right MCA origin stenosis.           2.  Unchanged high-grade left PCA P2 stenosis.           3.  No aneurysm or large vessel occlusion. Echo-2017   INCREASED LEFT VENTRICULAR CAVITY SIZE WITH MILD CONCENTRIC LEFT VENTRICULAR HYPERTROPHY. NORMAL GLOBAL LEFT VENTRICULAR SYSTOLIC FUNCTION WITH AN ESTIMATED EJECTION FRACTION OF   55%. MILD DIASTOLIC DYSFUNCTION. NORMAL PULMONARY ARTERY PRESSURE OF 11 MMHG. MILDLY DILATED LEFT ATRIUM. TRACE MITRAL AND TRICUSPID REGURGITATION. NO EVIDENCE OF AORTIC OR PULMONIC REGURGITATION. COMPARED WITH PREVIOUS REPORT DATED 2016, THE EJECTION FRACTION HAS INCREASED FROM 45%   TO 55%. NUCLEAR IMAGIN2017    Findings:   1. Stress images reveal normal Myoview distrubution in all the LV segments in short axis, vertical and horizontal long axis views. 2. Resting images have a normal uptake.    3. Gated images reveal normal wall motion and the ejection fraction is calculated to be 62%. Conclusion:   1. Normal perfusion scan. 2. Normal wall motion and ejection fraction. 3. Low risk scan. Assessment       ICD-10-CM ICD-9-CM    1. Atherosclerosis of native coronary artery of native heart with angina pectoris ([address] I25.119 414.01 LIPID PANEL     413.9 HEPATIC FUNCTION PANEL    stable pattern   2. Essential hypertension, benign I10 401.1     very labile  periods of hypotension   3. Postsurgical percutaneous transluminal coronary angioplasty status Z98.61 V45.82     stable   4. Vertebral artery stenosis, unspecified laterality I65.09 433.20    5. Orthostatic hypotension I95.1 458.0     has discussed using florinef daily with teds     will keep bp on high side due to frequent drop-advised florinef daily    Medications Discontinued During This Encounter   Medication Reason    tamsulosin (FLOMAX) 0.4 mg capsule Not A Current Medication    fludrocortisone (FLORINEF) 0.1 mg tablet Reorder    nitroglycerin (NITROSTAT) 0.4 mg SL tablet Reorder       Orders Placed This Encounter    LIPID PANEL     Standing Status:   Future     Standing Expiration Date:   10/26/2017    HEPATIC FUNCTION PANEL     Standing Status:   Future     Standing Expiration Date:   10/26/2017    fludrocortisone (FLORINEF) 0.1 mg tablet     Sig: Take 1 Tab by mouth daily. Dispense:  30 Tab     Refill:  6    nitroglycerin (NITROSTAT) 0.4 mg SL tablet     Si Tab by SubLINGual route every five (5) minutes as needed for Chest Pain. Dispense:  25 Tab     Refill:  1       Follow-up Disposition:  Return in about 3 months (around 2017).

## 2017-04-27 NOTE — PROGRESS NOTES
1. Have you been to the ER, urgent care clinic since your last visit? Hospitalized since your last visit? Yes [place]  2. Have you seen or consulted any other health care providers outside of the [address] [place] [address] since your last visit? Include any pap smears or colon screening. Yes Where: pcp     3. Since your last visit, have you had any of the following symptoms? shortness of breath,swelling, dizziness .

## 2017-04-27 NOTE — MR AVS SNAPSHOT
Visit Information Date & Time Provider Department Dept. Phone Encounter #  
 4/27/2017  9:30 AM Jing Kumar MD [facility] [phone] [id number] Follow-up Instructions Return in about 3 months (around 7/27/2017). Upcoming Health Maintenance Date Due Hepatitis C Screening 1950 FOBT Q 1 YEAR AGE 50-75 7/7/2000 ZOSTER VACCINE AGE 60> 7/7/2010 GLAUCOMA SCREENING Q2Y 7/7/2015 Pneumococcal 65+ Low/Medium Risk (1 of 2 - PCV13) 7/7/2015 MEDICARE YEARLY EXAM 7/7/2015 INFLUENZA AGE 9 TO ADULT 8/1/2016 DTaP/Tdap/Td series (2 - Td) 9/6/2022 Allergies as of 4/27/2017  Review Complete On: 4/27/2017 By: Jing Kumar MD  
  
 Severity Noted Reaction Type Reactions Codeine  09/06/2012    Other (comments) States keeps him awake Current Immunizations  Never Reviewed Name Date TDAP Vaccine 9/6/2012 11:06 AM  
  
 Not reviewed this visit You Were Diagnosed With   
  
 Codes Comments Atherosclerosis of native coronary artery of native heart with angina pectoris ([facility]    -  Primary ICD-10-CM: I25.119 ICD-9-CM: 414.01, 413.9 stable pattern Essential hypertension, benign     ICD-10-CM: I10 
ICD-9-CM: 401.1 very labile 
periods of hypotension Postsurgical percutaneous transluminal coronary angioplasty status     ICD-10-CM: Z98.61 ICD-9-CM: V45.82 stable Vertebral artery stenosis, unspecified laterality     ICD-10-CM: I65.09 
ICD-9-CM: 433.20 Orthostatic hypotension     ICD-10-CM: I95.1 ICD-9-CM: 458.0 has discussed using florinef daily with teds Vitals BP Pulse Height(growth percentile) Weight(growth percentile) BMI Smoking Status 145/72 73 6' 2\" (1.88 m) 215 lb (97.5 kg) 27.6 kg/m2 Former Smoker BMI and BSA Data Body Mass Index Body Surface Area  
 27.6 kg/m 2 2.26 m 2 Preferred Pharmacy Pharmacy Name Phone [facility] [address] [facility] [phone] Your Updated Medication List  
  
   
This list is accurate as of: 4/27/17  9:43 AM.  Always use your most recent med list.  
  
  
  
  
 acetaminophen 325 mg tablet Commonly known as:  TYLENOL Take  by mouth every four (4) hours as needed for Pain. amantadine HCl 100 mg capsule Commonly known as:  Simran Brazen Take  by mouth two (2) times a day. aspirin delayed-release 81 mg tablet Take  by mouth daily. ATIVAN 1 mg tablet Generic drug:  LORazepam  
Take 2 mg by mouth every eight (8) hours as needed. atorvastatin 80 mg tablet Commonly known as:  LIPITOR Take 40 mg by mouth daily. AVODART 0.5 mg capsule Generic drug:  dutasteride Take 0.5 mg by mouth daily. carbidopa-levodopa-entacapone 37.5-150-200 mg tablet Commonly known as:  STALEVO Take 1 Tab by mouth four (4) times daily. -200  
  
 cephALEXin 500 mg capsule Commonly known as:  Gely Pluck Take 500 mg by mouth two (2) times a day. clopidogrel 75 mg Tab Commonly known as:  PLAVIX TAKE 1 TABLET BY MOUTH EVERY DAY Comp. Stocking,Thigh,Reg,Medium Misc  
1 Units by Does Not Apply route daily. fish oil-omega-3 fatty acids 340-1,000 mg capsule Take 1 Cap by mouth daily. fludrocortisone 0.1 mg tablet Commonly known as:  FLORINEF Take 1 Tab by mouth daily. melatonin 10 mg Tab Take  by mouth. NEURONTIN 300 mg capsule Generic drug:  gabapentin Take 300 mg by mouth three (3) times daily. nitroglycerin 0.4 mg SL tablet Commonly known as:  NITROSTAT  
1 Tab by SubLINGual route every five (5) minutes as needed for Chest Pain. REMERON 30 mg tablet Generic drug:  mirtazapine Take  by mouth nightly. ZOLOFT 50 mg tablet Generic drug:  sertraline Take  by mouth daily. Prescriptions Sent to Pharmacy Refills  
 fludrocortisone (FLORINEF) 0.1 mg tablet 6 Sig: Take 1 Tab by mouth daily. Class: Normal  
 Pharmacy: [facility] [address] [place] [address] #: [phone] Route: Oral  
 nitroglycerin (NITROSTAT) 0.4 mg SL tablet 1 Si Tab by SubLINGual route every five (5) minutes as needed for Chest Pain. Class: Normal  
 Pharmacy: [facility] [place] [address] Ph #: [phone] Route: SubLINGual  
  
Follow-up Instructions Return in about 3 months (around 2017). To-Do List   
 2017 Lab:  HEPATIC FUNCTION PANEL   
  
 2017 Lab:  LIPID PANEL Introducing [facility] SERVICES! Zeyad Hill introduces [employer] patient portal. Now you can access parts of your medical record, email your doctor's office, and request medication refills online. 1. In your internet browser, go to [email or web address] 2. Click on the First Time User? Click Here link in the Sign In box. You will see the New Member Sign Up page. 3. Enter your [employer] Access Code exactly as it appears below. You will not need to use this code after youve completed the sign-up process. If you do not sign up before the expiration date, you must request a new code. · [employer] Access Code: [email or web address] Expires: 2017  9:22 AM 
 
4. Enter the last four digits of your Social Security Number (xxxx) and Date of Birth (mm/dd/yyyy) as indicated and click Submit. You will be taken to the next sign-up page. 5. Create a [employer] ID. This will be your [employer] login ID and cannot be changed, so think of one that is secure and easy to remember. 6. Create a [employer] password. You can change your password at any time. 7. Enter your Password Reset Question and Answer. This can be used at a later time if you forget your password. 8. Enter your e-mail address. You will receive e-mail notification when new information is available in [address] 9. Click Sign Up. You can now view and download portions of your medical record. 10. Click the Download Summary menu link to download a portable copy of your medical information. If you have questions, please visit the Frequently Asked Questions section of the [email or web address] website. Remember, [email or web address] is NOT to be used for urgent needs. For medical emergencies, dial 911. Now available from your iPhone and Android! Please provide this summary of care documentation to your next provider. Your primary care clinician is listed as OTF ALLEN. If you have any questions after today's visit, please call [phone].

## 2017-06-03 LAB
A-G RATIO,AGRAT: 1.7 RATIO (ref 1.1–2.6)
ALBUMIN SERPL-MCNC: 4.3 G/DL (ref 3.5–5)
ALP SERPL-CCNC: 71 U/L (ref 40–125)
ALT SERPL-CCNC: <5 U/L (ref 5–40)
AST SERPL W P-5'-P-CCNC: 14 U/L (ref 10–37)
BILIRUB SERPL-MCNC: 0.5 MG/DL (ref 0.2–1.2)
BILIRUBIN, DIRECT,CBIL: <0.2 MG/DL (ref 0–0.3)
CHOLEST SERPL-MCNC: 108 MG/DL (ref 110–200)
GLOBULIN,GLOB: 2.5 G/DL (ref 2–4)
HDLC SERPL-MCNC: 22 MG/DL (ref 40–59)
LDLC SERPL CALC-MCNC: 29 MG/DL (ref 50–99)
PROT SERPL-MCNC: 6.8 G/DL (ref 6.2–8.1)
TRIGL SERPL-MCNC: 285 MG/DL (ref 40–149)
VLDLC SERPL CALC-MCNC: 57 MG/DL (ref 8–30)

## 2017-06-05 ENCOUNTER — TELEPHONE (OUTPATIENT)
Dept: CARDIOLOGY CLINIC | Age: 67
End: 2017-06-05

## 2017-06-05 NOTE — TELEPHONE ENCOUNTER
Patient called ot disccuss elevated cholesterol. Carbohydrates discussed and patient will decrease the amount of carbs that are consumed. Patient also states that he was fasting for this blood work except for chewing gum. He voices understanding and acceptance of this advice and will call back if any further questions or concerns.

## 2017-07-17 ENCOUNTER — OFFICE VISIT (OUTPATIENT)
Dept: CARDIOLOGY CLINIC | Age: 67
End: 2017-07-17

## 2017-07-17 VITALS
DIASTOLIC BLOOD PRESSURE: 71 MMHG | WEIGHT: 205 LBS | HEIGHT: 74 IN | SYSTOLIC BLOOD PRESSURE: 121 MMHG | BODY MASS INDEX: 26.31 KG/M2 | HEART RATE: 68 BPM

## 2017-07-17 DIAGNOSIS — E78.5 HYPERLIPIDEMIA, UNSPECIFIED HYPERLIPIDEMIA TYPE: ICD-10-CM

## 2017-07-17 DIAGNOSIS — Z98.61 POSTSURGICAL PERCUTANEOUS TRANSLUMINAL CORONARY ANGIOPLASTY STATUS: ICD-10-CM

## 2017-07-17 DIAGNOSIS — I25.119 ATHEROSCLEROSIS OF NATIVE CORONARY ARTERY OF NATIVE HEART WITH ANGINA PECTORIS (HCC): Primary | ICD-10-CM

## 2017-07-17 DIAGNOSIS — I10 ESSENTIAL HYPERTENSION, BENIGN: ICD-10-CM

## 2017-07-17 DIAGNOSIS — I95.1 ORTHOSTATIC HYPOTENSION: ICD-10-CM

## 2017-07-17 RX ORDER — FLUDROCORTISONE ACETATE 0.1 MG/1
0.1 TABLET ORAL 2 TIMES DAILY
Qty: 60 TAB | Refills: 6 | Status: SHIPPED | OUTPATIENT
Start: 2017-07-17 | End: 2017-07-18 | Stop reason: SDUPTHER

## 2017-07-17 RX ORDER — FLUDROCORTISONE ACETATE 0.1 MG/1
0.1 TABLET ORAL 2 TIMES DAILY
Qty: 60 TAB | Refills: 6 | Status: SHIPPED | OUTPATIENT
Start: 2017-07-17 | End: 2017-07-17 | Stop reason: SDUPTHER

## 2017-07-17 NOTE — MR AVS SNAPSHOT
Visit Information Date & Time Provider Department Dept. Phone Encounter #  
 7/17/2017  9:00 AM Angel Ramirez MD [facility] [id number] Follow-up Instructions Return in about 6 months (around 1/17/2018). Upcoming Health Maintenance Date Due Hepatitis C Screening 1950 FOBT Q 1 YEAR AGE 50-75 7/7/2000 ZOSTER VACCINE AGE 60> 7/7/2010 GLAUCOMA SCREENING Q2Y 7/7/2015 Pneumococcal 65+ Low/Medium Risk (1 of 2 - PCV13) 7/7/2015 MEDICARE YEARLY EXAM 7/7/2015 INFLUENZA AGE 9 TO ADULT 8/1/2017 DTaP/Tdap/Td series (2 - Td) 9/6/2022 Allergies as of 7/17/2017  Review Complete On: 7/17/2017 By: Angel Ramirez MD  
  
 Severity Noted Reaction Type Reactions Codeine  09/06/2012    Other (comments) States keeps him awake Current Immunizations  Never Reviewed Name Date TDAP Vaccine 9/6/2012 11:06 AM  
  
 Not reviewed this visit You Were Diagnosed With   
  
 Codes Comments Atherosclerosis of native coronary artery of native heart with angina pectoris ([facility] [address]    -  Primary ICD-10-CM: I25.119 ICD-9-CM: 414.01, 413.9 stable Essential hypertension, benign     ICD-10-CM: I10 
ICD-9-CM: 401.1 stable Postsurgical percutaneous transluminal coronary angioplasty status     ICD-10-CM: Z98.61 ICD-9-CM: V45.82 Hyperlipidemia, unspecified hyperlipidemia type     ICD-10-CM: E78.5 ICD-9-CM: 272.4 low ldl,hdl low,high tg Orthostatic hypotension     ICD-10-CM: I95.1 ICD-9-CM: 458.0 still low bp at home 
tri increase florinef Vitals BP Pulse Height(growth percentile) Weight(growth percentile) BMI Smoking Status 121/71 68 6' 2\" (1.88 m) 205 lb (93 kg) 26.32 kg/m2 Former Smoker Vitals History BMI and BSA Data Body Mass Index Body Surface Area  
 26.32 kg/m 2 2.2 m 2 Preferred Pharmacy Pharmacy Name Phone [id number] [address] [place] [address] Your Updated Medication List  
  
   
This list is accurate as of: 7/17/17  9:33 AM.  Always use your most recent med list.  
  
  
  
  
 acetaminophen 325 mg tablet Commonly known as:  TYLENOL Take  by mouth every four (4) hours as needed for Pain. amantadine HCl 100 mg capsule Commonly known as:  Swati Callander Take  by mouth two (2) times a day. aspirin delayed-release 81 mg tablet Take  by mouth daily. ATIVAN 1 mg tablet Generic drug:  LORazepam  
Take 2 mg by mouth every eight (8) hours as needed. atorvastatin 80 mg tablet Commonly known as:  LIPITOR Take 40 mg by mouth daily. AVODART 0.5 mg capsule Generic drug:  dutasteride Take 0.5 mg by mouth daily. carbidopa-levodopa-entacapone 37.5-150-200 mg tablet Commonly known as:  STALEVO Take 1 Tab by mouth four (4) times daily. -200  
  
 cephALEXin 500 mg capsule Commonly known as:  Den Elly Take 500 mg by mouth two (2) times a day. clopidogrel 75 mg Tab Commonly known as:  PLAVIX TAKE 1 TABLET BY MOUTH EVERY DAY Comp. Stocking,Thigh,Reg,Medium Misc  
1 Units by Does Not Apply route daily. fish oil-omega-3 fatty acids 340-1,000 mg capsule Take 1 Cap by mouth daily. fludrocortisone 0.1 mg tablet Commonly known as:  FLORINEF Take 1 Tab by mouth two (2) times a day. melatonin 10 mg Tab Take  by mouth. NEURONTIN 300 mg capsule Generic drug:  gabapentin Take 300 mg by mouth three (3) times daily. nitroglycerin 0.4 mg SL tablet Commonly known as:  NITROSTAT  
1 Tab by SubLINGual route every five (5) minutes as needed for Chest Pain. REMERON 30 mg tablet Generic drug:  mirtazapine Take  by mouth nightly. ZOLOFT 50 mg tablet Generic drug:  sertraline Take  by mouth daily. Prescriptions Sent to Pharmacy Refills  
 fludrocortisone (FLORINEF) 0.1 mg tablet 6 Sig: Take 1 Tab by mouth two (2) times a day. Class: Normal  
 Pharmacy[id number], [address] [place] [address] #: [phone] Route: Oral  
  
Follow-up Instructions Return in about 6 months (around 1/17/2018). Introducing [facility]! Benito Song introduces [employer] patient portal. Now you can access parts of your medical record, email your doctor's office, and request medication refills online. 1. In your internet browser, go to [email or web address] 2. Click on the First Time User? Click Here link in the Sign In box. You will see the New Member Sign Up page. 3. Enter your [employer] Access Code exactly as it appears below. You will not need to use this code after youve completed the sign-up process. If you do not sign up before the expiration date, you must request a new code. · [employer] Access Code: [email or web address] Expires: 7/26/2017  9:22 AM 
 
4. Enter the last four digits of your Social Security Number (xxxx) and Date of Birth (mm/dd/yyyy) as indicated and click Submit. You will be taken to the next sign-up page. 5. Create a [employer] ID. This will be your [employer] login ID and cannot be changed, so think of one that is secure and easy to remember. 6. Create a [employer] password. You can change your password at any time. 7. Enter your Password Reset Question and Answer. This can be used at a later time if you forget your password. 8. Enter your e-mail address. You will receive e-mail notification when new information is available in [address] 9. Click Sign Up. You can now view and download portions of your medical record. 10. Click the Download Summary menu link to download a portable copy of your medical information. If you have questions, please visit the Frequently Asked Questions section of the [employer] website. Remember, [employer] is NOT to be used for urgent needs. For medical emergencies, dial 911. Now available from your iPhone and Android! Please provide this summary of care documentation to your next provider. Your primary care clinician is listed as O[facility] ALLEN. If you have any questions after today's visit, please call [phone].

## 2017-07-17 NOTE — PROGRESS NOTES
1. Have you been to the ER, urgent care clinic since your last visit? Hospitalized since your last visit?     no  2. Have you seen or consulted any other health care providers outside of the [address] [place] [address] since your last visit? Include any pap smears or colon screening. Yes Where: pcp     3. Since your last visit, have you had any of the following symptoms? chest pains and shortness of breath.        Explain

## 2017-07-17 NOTE — LETTER
7/17/2017 9:38 AM 
 
Patient:  Bam Dotson YOB: 1950 Date of Visit: 7/17/2017 Dear No Recipients: Thank you for referring Mr. Bam Dotson to me for evaluation/treatment. Below are the relevant portions of my assessment and plan of care. If you have questions, please do not hesitate to call me. I look forward to following Mr. Trino Justice along with you. Sincerely, Sandi Ambrosio MD

## 2017-07-17 NOTE — PROGRESS NOTES
HISTORY OF PRESENT ILLNESS  Jack Freitas is a 79 y.o. male. HPI Comments: Patient with cad,old pci,hyperlipidemia. On follow up patient denies any chest pains, palpitation or other significant symptoms. He has sob on exertion. Improving  h/o syncope-new neurological finding    Hypertension   The history is provided by the patient. This is a chronic problem. The problem occurs constantly. The problem has not changed since onset. Associated symptoms include chest pain and shortness of breath. Pertinent negatives include no abdominal pain and no headaches. Chest Pain (Angina)    The history is provided by the patient. This is a new problem. The current episode started more than 1 week ago. The problem has not changed since onset. The problem occurs every several days. The pain is associated with exertion. The pain is present in the substernal region. The pain is mild. The quality of the pain is described as pressure-like. The pain does not radiate. Associated symptoms include dizziness and shortness of breath. Pertinent negatives include no abdominal pain, no claudication, no cough, no fever, no headaches, no hemoptysis, no nausea, no orthopnea, no palpitations, no PND, no sputum production, no vomiting and no weakness. Cholesterol Problem   The history is provided by the patient. This is a chronic problem. The problem occurs constantly. The problem has not changed since onset. Associated symptoms include chest pain and shortness of breath. Pertinent negatives include no abdominal pain and no headaches. Shortness of Breath   The history is provided by the patient. This is a chronic problem. The problem occurs intermittently. The problem has been gradually improving. Associated symptoms include chest pain. Pertinent negatives include no fever, no headaches, no cough, no sputum production, no hemoptysis, no wheezing, no PND, no orthopnea, no vomiting, no abdominal pain, no rash, no leg swelling and no claudication. Dizziness   The history is provided by the patient. This is a chronic problem. The problem occurs every several days. The problem has not changed since onset. Associated symptoms include chest pain and shortness of breath. Pertinent negatives include no abdominal pain and no headaches. The symptoms are aggravated by exertion and standing. Nothing relieves the symptoms. Review of Systems   Constitutional: Negative for chills and fever. HENT: Negative for nosebleeds. Eyes: Negative for blurred vision and double vision. Respiratory: Positive for shortness of breath. Negative for cough, hemoptysis, sputum production and wheezing. Cardiovascular: Positive for chest pain. Negative for palpitations, orthopnea, claudication, leg swelling and PND. Gastrointestinal: Negative for abdominal pain, heartburn, nausea and vomiting. Musculoskeletal: Negative for myalgias. Skin: Negative for rash. Neurological: Positive for dizziness and tingling. Negative for loss of consciousness, weakness and headaches. Endo/Heme/Allergies: Does not bruise/bleed easily. Family History   Problem Relation Age of Onset    Heart Disease Other        Past Medical History:   Diagnosis Date    CAD (coronary artery disease)     Coronary atherosclerosis of native coronary artery 3/7/2014    stable  old pci sob on exertion. r/o ischemia or cmp     Essential hypertension, benign 3/7/2014    old pci's     Hypotension, unspecified 3/7/2014    old pci's     Other and unspecified hyperlipidemia 3/7/2014    old pci's     Parkinson disease ([address] 3/7/2014    old pci's     Parkinson's disease ([facility] [address]     Postsurgical percutaneous transluminal coronary angioplasty status 3/7/2014    old pci's        Past Surgical History:   Procedure Laterality Date    CARDIAC SURG PROCEDURE UNLIST      stents    HX CERVICAL FUSION      HX HERNIA REPAIR Left 12/3/13    [place]    HX OTHER SURGICAL      deep brain stimulator       Social History Substance Use Topics    Smoking status: Former Smoker     Quit date: 2/8/2016    Smokeless tobacco: Never Used    Alcohol use Yes      Comment: occasional       Allergies   Allergen Reactions    Codeine Other (comments)     States keeps him awake       Current Outpatient Prescriptions   Medication Sig    fludrocortisone (FLORINEF) 0.1 mg tablet Take 1 Tab by mouth two (2) times a day.  cephALEXin (KEFLEX) 500 mg capsule Take 500 mg by mouth two (2) times a day.  melatonin 10 mg tab Take  by mouth.  fish oil-omega-3 fatty acids 340-1,000 mg capsule Take 1 Cap by mouth daily.  nitroglycerin (NITROSTAT) 0.4 mg SL tablet 1 Tab by SubLINGual route every five (5) minutes as needed for Chest Pain.  clopidogrel (PLAVIX) 75 mg tab TAKE 1 TABLET BY MOUTH EVERY DAY    Comp. Stocking,Thigh,Reg,Medium misc 1 Units by Does Not Apply route daily.  mirtazapine (REMERON) 30 mg tablet Take  by mouth nightly.  acetaminophen (TYLENOL) 325 mg tablet Take  by mouth every four (4) hours as needed for Pain.  sertraline (ZOLOFT) 50 mg tablet Take  by mouth daily.  gabapentin (NEURONTIN) 300 mg capsule Take 300 mg by mouth three (3) times daily.  dutasteride (AVODART) 0.5 mg capsule Take 0.5 mg by mouth daily.  aspirin delayed-release 81 mg tablet Take  by mouth daily.  amantadine HCl (SYMMETREL) 100 mg capsule Take  by mouth two (2) times a day.  atorvastatin (LIPITOR) 80 mg tablet Take 40 mg by mouth daily.  LORazepam (ATIVAN) 1 mg tablet Take 2 mg by mouth every eight (8) hours as needed.  carbidopa-levodopa-entacapone (STALEVO) 37.5-150-200 mg tablet Take 1 Tab by mouth four (4) times daily. -200     No current facility-administered medications for this visit. Visit Vitals    /71    Pulse 68    Ht 6' 2\" (1.88 m)    Wt 93 kg (205 lb)    BMI 26.32 kg/m2       Physical Exam   Constitutional: He is oriented to person, place, and time.  He appears well-developed and well-nourished. HENT:   Head: Normocephalic and atraumatic. Eyes: Conjunctivae are normal.   Neck: Neck supple. No JVD present. No tracheal deviation present. No thyromegaly present. Cardiovascular: Normal rate, regular rhythm and normal heart sounds. Exam reveals no gallop and no friction rub. No murmur heard. Pulmonary/Chest: Breath sounds normal. No respiratory distress. He has no wheezes. He has no rales. He exhibits no tenderness. Brain stimulator generator on rt side   Abdominal: Soft. There is no tenderness. Musculoskeletal: He exhibits no edema. Neurological: He is alert and oriented to person, place, and time. Skin: Skin is warm and dry. Psychiatric: He has a normal mood and affect. Mr. Bree Rich has a reminder for a \"due or due soon\" health maintenance. I have asked that he contact his primary care provider for follow-up on this health maintenance. NUCLEAR IMAGING: 3/2014  Findings:   1. Post-stress imaging in short axis, horizontal and vertical long axis views reveals a mild defect in isotope uptake along inferobasal wall. 2. Resting images also show persistent defect in inferobasal wall without any normalization. 3. Gated images show normal left ventricular size, wall motion and systolic function. Ejection fraction is 50%. Diagnosis:   1. Probably normal scan. 2. Evidence of mild fixed defect of inferobasal wall noted with normal wall motion, likely suggestive of tissue attenuation. 3.   SUMMARY:echo:3/2014  Left ventricle: The ventricle was mildly dilated. Ejection fraction was  estimated to be 55 %. No obvious wall motion abnormalities identified in  the views obtained. There was mild concentric hypertrophy. Features were  consistent with a pseudonormal left ventricular filling pattern, with  concomitant abnormal relaxation and increased filling pressure (grade 2  diastolic dysfunction). Impression:2016    1. Extensive atherosclerotic vascular disease.   High grade stenoses (greater than 75%) are suggested at the origins of both vertebral arteries. 2.  Heterogeneous plaque involving both carotid bifurcations with moderate stenoses involving both internal carotid artery origins, as described above. 3.  High-grade stenoses involving the right carotid siphon with moderate to high-grade stenoses in the left carotid siphon. 4.  Probable high-grade stenosis involving the distal P2 segment of the left posterior cerebral artery. cta-2017  CTA NECK: Unchanged since 16.              SANCHEZ:  Critical intracranial atherosclerotic stenosis.  Extracranial stenosis not well characterized, perhaps 50-70%.           LICA:  High-grade intracranial atherosclerotic stenosis.   ~30-40% extracranial stenosis.           RVA:  Origin stenosis, at least moderate.              LVA:  Critical origin stenosis.           VERTEBROBASILAR COLLATERAL SUPPORT: Small bilateral posterior communicating arteries.       CTA BRAIN:  Significant right MCA change since 16.              1.  New high-grade right MCA origin stenosis.           2.  Unchanged high-grade left PCA P2 stenosis.           3.  No aneurysm or large vessel occlusion. Echo-2017   INCREASED LEFT VENTRICULAR CAVITY SIZE WITH MILD CONCENTRIC LEFT VENTRICULAR HYPERTROPHY. NORMAL GLOBAL LEFT VENTRICULAR SYSTOLIC FUNCTION WITH AN ESTIMATED EJECTION FRACTION OF   55%. MILD DIASTOLIC DYSFUNCTION. NORMAL PULMONARY ARTERY PRESSURE OF 11 MMHG. MILDLY DILATED LEFT ATRIUM. TRACE MITRAL AND TRICUSPID REGURGITATION. NO EVIDENCE OF AORTIC OR PULMONIC REGURGITATION. COMPARED WITH PREVIOUS REPORT DATED 2016, THE EJECTION FRACTION HAS INCREASED FROM 45%   TO 55%. NUCLEAR IMAGIN2017    Findings:   1. Stress images reveal normal Myoview distrubution in all the LV segments in short axis, vertical and horizontal long axis views. 2. Resting images have a normal uptake.    3. Gated images reveal normal wall motion and the ejection fraction is calculated to be 62%. Conclusion:   1. Normal perfusion scan. 2. Normal wall motion and ejection fraction. 3. Low risk scan. I Have personally reviewed recent relevant labs available and discussed with patient  6/2017-lipid,lft  Assessment       ICD-10-CM ICD-9-CM    1. Atherosclerosis of native coronary artery of native heart with angina pectoris ([facility] [address] I25.119 414.01      413.9     stable   2. Essential hypertension, benign I10 401.1     stable   3. Postsurgical percutaneous transluminal coronary angioplasty status Z98.61 V45.82    4. Hyperlipidemia, unspecified hyperlipidemia type E78.5 272.4     low ldl,hdl low,high tg   5. Orthostatic hypotension I95.1 458.0     still low bp at home  tri increase florinef   will keep bp on high side due to frequent drop-advised florinef daily        Orders Placed This Encounter    DISCONTD: fludrocortisone (FLORINEF) 0.1 mg tablet     Sig: Take 1 Tab by mouth two (2) times a day. Dispense:  60 Tab     Refill:  6    fludrocortisone (FLORINEF) 0.1 mg tablet     Sig: Take 1 Tab by mouth two (2) times a day. Dispense:  60 Tab     Refill:  6       Follow-up Disposition:  Return in about 6 months (around 1/17/2018).

## 2017-07-18 RX ORDER — FLUDROCORTISONE ACETATE 0.1 MG/1
0.1 TABLET ORAL 2 TIMES DAILY
Qty: 180 TAB | Refills: 3 | Status: SHIPPED | OUTPATIENT
Start: 2017-07-18

## 2017-08-14 RX ORDER — NITROGLYCERIN 0.4 MG/1
0.4 TABLET SUBLINGUAL
Qty: 75 TAB | Refills: 3 | Status: SHIPPED | OUTPATIENT
Start: 2017-08-14 | End: 2017-08-19 | Stop reason: SDUPTHER

## 2017-08-23 RX ORDER — NITROGLYCERIN 0.4 MG/1
0.4 TABLET SUBLINGUAL
Qty: 30 TAB | Refills: 0 | Status: SHIPPED | OUTPATIENT
Start: 2017-08-23 | End: 2018-10-18 | Stop reason: SDUPTHER

## 2017-10-17 ENCOUNTER — OFFICE VISIT (OUTPATIENT)
Dept: CARDIOLOGY CLINIC | Age: 67
End: 2017-10-17

## 2017-10-17 VITALS
SYSTOLIC BLOOD PRESSURE: 119 MMHG | HEIGHT: 74 IN | WEIGHT: 185 LBS | BODY MASS INDEX: 23.74 KG/M2 | DIASTOLIC BLOOD PRESSURE: 61 MMHG | HEART RATE: 77 BPM

## 2017-10-17 DIAGNOSIS — I25.119 ATHEROSCLEROSIS OF NATIVE CORONARY ARTERY OF NATIVE HEART WITH ANGINA PECTORIS (HCC): Primary | ICD-10-CM

## 2017-10-17 DIAGNOSIS — I95.1 ORTHOSTATIC HYPOTENSION: ICD-10-CM

## 2017-10-17 DIAGNOSIS — I10 ESSENTIAL HYPERTENSION, BENIGN: ICD-10-CM

## 2017-10-17 DIAGNOSIS — Z98.61 POSTSURGICAL PERCUTANEOUS TRANSLUMINAL CORONARY ANGIOPLASTY STATUS: ICD-10-CM

## 2017-10-17 DIAGNOSIS — I65.09 VERTEBRAL ARTERY STENOSIS, UNSPECIFIED LATERALITY: ICD-10-CM

## 2017-10-17 RX ORDER — ASPIRIN 325 MG
81 TABLET ORAL DAILY
COMMUNITY

## 2017-10-17 NOTE — PROGRESS NOTES
HISTORY OF PRESENT ILLNESS  Stanford Durand is a 79 y.o. male. HPI Comments: Patient with cad,old pci,hyperlipidemia. On follow up patient denies any chest pains, palpitation or other significant symptoms. He has sob on exertion. Improving  h/o syncope-new neurological finding  [id number]  10/2017-er visit    Hypertension   The history is provided by the patient. This is a chronic problem. The problem occurs constantly. The problem has not changed since onset. Associated symptoms include chest pain and shortness of breath. Pertinent negatives include no abdominal pain and no headaches. Chest Pain (Angina)    The history is provided by the patient. This is a new problem. The current episode started more than 1 week ago. The problem has not changed since onset. The problem occurs every several days. The pain is associated with exertion. The pain is present in the substernal region. The pain is mild. The quality of the pain is described as pressure-like. The pain does not radiate. Associated symptoms include dizziness and shortness of breath. Pertinent negatives include no abdominal pain, no claudication, no cough, no fever, no headaches, no hemoptysis, no nausea, no orthopnea, no palpitations, no PND, no sputum production, no vomiting and no weakness. Cholesterol Problem   The history is provided by the patient. This is a chronic problem. The problem occurs constantly. The problem has not changed since onset. Associated symptoms include chest pain and shortness of breath. Pertinent negatives include no abdominal pain and no headaches. Shortness of Breath   The history is provided by the patient. This is a chronic problem. The problem occurs intermittently. The problem has been gradually improving. Associated symptoms include chest pain.  Pertinent negatives include no fever, no headaches, no cough, no sputum production, no hemoptysis, no wheezing, no PND, no orthopnea, no vomiting, no abdominal pain, no rash, no leg swelling and no claudication. Dizziness   The history is provided by the patient. This is a chronic problem. The problem occurs every several days. The problem has not changed since onset. Associated symptoms include chest pain and shortness of breath. Pertinent negatives include no abdominal pain and no headaches. The symptoms are aggravated by exertion and standing. Nothing relieves the symptoms. Review of Systems   Constitutional: Negative for chills and fever. HENT: Negative for nosebleeds. Eyes: Negative for blurred vision and double vision. Respiratory: Positive for shortness of breath. Negative for cough, hemoptysis, sputum production and wheezing. Cardiovascular: Positive for chest pain. Negative for palpitations, orthopnea, claudication, leg swelling and PND. Gastrointestinal: Negative for abdominal pain, heartburn, nausea and vomiting. Musculoskeletal: Negative for myalgias. Skin: Negative for rash. Neurological: Positive for dizziness and tingling. Negative for loss of consciousness, weakness and headaches. Endo/Heme/Allergies: Does not bruise/bleed easily. Family History   Problem Relation Age of Onset    Heart Disease Other        Past Medical History:   Diagnosis Date    CAD (coronary artery disease)     Coronary atherosclerosis of native coronary artery 3/7/2014    stable  old pci sob on exertion. r/o ischemia or cmp     Essential hypertension, benign 3/7/2014    old pci's     Hypotension, unspecified 3/7/2014    old pci's     Other and unspecified hyperlipidemia 3/7/2014    old pci's     Parkinson disease ([address] 3/7/2014    old pci's     Parkinson's disease ([facility] [address]     Postsurgical percutaneous transluminal coronary angioplasty status 3/7/2014    old pci's        Past Surgical History:   Procedure Laterality Date    CARDIAC SURG PROCEDURE UNLIST      stents    HX CERVICAL FUSION      HX HERNIA REPAIR Left 12/3/13    [place]    HX OTHER SURGICAL      deep brain stimulator       Social History   Substance Use Topics    Smoking status: Former Smoker     Quit date: 2/8/2016    Smokeless tobacco: Never Used    Alcohol use Yes      Comment: occasional       Allergies   Allergen Reactions    Codeine Other (comments)     States keeps him awake       Current Outpatient Prescriptions   Medication Sig    LISINOPRIL PO Take  by mouth daily.  aspirin (ASPIRIN) 325 mg tablet Take 325 mg by mouth daily.  nitroglycerin (NITROSTAT) 0.4 mg SL tablet 1 Tab by SubLINGual route every five (5) minutes as needed for Chest Pain for up to 3 doses.  fludrocortisone (FLORINEF) 0.1 mg tablet Take 1 Tab by mouth two (2) times a day.  cephALEXin (KEFLEX) 500 mg capsule Take 500 mg by mouth two (2) times a day.  melatonin 10 mg tab Take  by mouth.  fish oil-omega-3 fatty acids 340-1,000 mg capsule Take 1 Cap by mouth daily.  clopidogrel (PLAVIX) 75 mg tab TAKE 1 TABLET BY MOUTH EVERY DAY    Comp. Stocking,Thigh,Reg,Medium misc 1 Units by Does Not Apply route daily.  mirtazapine (REMERON) 30 mg tablet Take  by mouth nightly.  acetaminophen (TYLENOL) 325 mg tablet Take  by mouth every four (4) hours as needed for Pain.  sertraline (ZOLOFT) 50 mg tablet Take  by mouth two (2) times a day.  gabapentin (NEURONTIN) 300 mg capsule Take 300 mg by mouth three (3) times daily.  dutasteride (AVODART) 0.5 mg capsule Take 0.5 mg by mouth daily.  amantadine HCl (SYMMETREL) 100 mg capsule Take  by mouth two (2) times a day.  atorvastatin (LIPITOR) 80 mg tablet Take 40 mg by mouth daily.  LORazepam (ATIVAN) 1 mg tablet Take 2 mg by mouth every eight (8) hours as needed.  carbidopa-levodopa-entacapone (STALEVO) 37.5-150-200 mg tablet Take 1 Tab by mouth four (4) times daily. -200     No current facility-administered medications for this visit.         Visit Vitals    /61    Pulse 77    Ht 6' 2\" (1.88 m)    Wt 83.9 kg (185 lb)    BMI 23.75 kg/m2 Physical Exam   Constitutional: He is oriented to person, place, and time. He appears well-developed and well-nourished. HENT:   Head: Normocephalic and atraumatic. Eyes: Conjunctivae are normal.   Neck: Neck supple. No JVD present. No tracheal deviation present. No thyromegaly present. Cardiovascular: Normal rate, regular rhythm and normal heart sounds. Exam reveals no gallop and no friction rub. No murmur heard. Pulmonary/Chest: Breath sounds normal. No respiratory distress. He has no wheezes. He has no rales. He exhibits no tenderness. Brain stimulator generator on rt side   Abdominal: Soft. There is no tenderness. Musculoskeletal: He exhibits no edema. Neurological: He is alert and oriented to person, place, and time. Skin: Skin is warm and dry. Psychiatric: He has a normal mood and affect. Mr. Basil Kennedy has a reminder for a \"due or due soon\" health maintenance. I have asked that he contact his primary care provider for follow-up on this health maintenance. NUCLEAR IMAGING: 3/2014  Findings:   1. Post-stress imaging in short axis, horizontal and vertical long axis views reveals a mild defect in isotope uptake along inferobasal wall. 2. Resting images also show persistent defect in inferobasal wall without any normalization. 3. Gated images show normal left ventricular size, wall motion and systolic function. Ejection fraction is 50%. Diagnosis:   1. Probably normal scan. 2. Evidence of mild fixed defect of inferobasal wall noted with normal wall motion, likely suggestive of tissue attenuation. 3.   SUMMARY:echo:3/2014  Left ventricle: The ventricle was mildly dilated. Ejection fraction was  estimated to be 55 %. No obvious wall motion abnormalities identified in  the views obtained. There was mild concentric hypertrophy.  Features were  consistent with a pseudonormal left ventricular filling pattern, with  concomitant abnormal relaxation and increased filling pressure (grade 2  diastolic dysfunction). Impression:2016    1. Extensive atherosclerotic vascular disease. High grade stenoses (greater than 75%) are suggested at the origins of both vertebral arteries. 2.  Heterogeneous plaque involving both carotid bifurcations with moderate stenoses involving both internal carotid artery origins, as described above. 3.  High-grade stenoses involving the right carotid siphon with moderate to high-grade stenoses in the left carotid siphon. 4.  Probable high-grade stenosis involving the distal P2 segment of the left posterior cerebral artery. cta-2017  CTA NECK: Unchanged since 16.              SANCHEZ:  Critical intracranial atherosclerotic stenosis.  Extracranial stenosis not well characterized, perhaps 50-70%.           LICA:  High-grade intracranial atherosclerotic stenosis.   ~30-40% extracranial stenosis.           RVA:  Origin stenosis, at least moderate.              LVA:  Critical origin stenosis.           VERTEBROBASILAR COLLATERAL SUPPORT: Small bilateral posterior communicating arteries.       CTA BRAIN:  Significant right MCA change since 16.              1.  New high-grade right MCA origin stenosis.           2.  Unchanged high-grade left PCA P2 stenosis.           3.  No aneurysm or large vessel occlusion. Echo-2017   INCREASED LEFT VENTRICULAR CAVITY SIZE WITH MILD CONCENTRIC LEFT VENTRICULAR HYPERTROPHY. NORMAL GLOBAL LEFT VENTRICULAR SYSTOLIC FUNCTION WITH AN ESTIMATED EJECTION FRACTION OF   55%. MILD DIASTOLIC DYSFUNCTION. NORMAL PULMONARY ARTERY PRESSURE OF 11 MMHG. MILDLY DILATED LEFT ATRIUM. TRACE MITRAL AND TRICUSPID REGURGITATION. NO EVIDENCE OF AORTIC OR PULMONIC REGURGITATION. COMPARED WITH PREVIOUS REPORT DATED 2016, THE EJECTION FRACTION HAS INCREASED FROM 45%   TO 55%. NUCLEAR IMAGIN2017    Findings:   1.  Stress images reveal normal Myoview distrubution in all the LV segments in short axis, vertical and horizontal long axis views. 2. Resting images have a normal uptake. 3. Gated images reveal normal wall motion and the ejection fraction is calculated to be 62%. Conclusion:   1. Normal perfusion scan. 2. Normal wall motion and ejection fraction. 3. Low risk scan. I Have personally reviewed recent relevant labs available and discussed with patient  6/2017-lipid,lft  I have personally reviewed patient's records available from hospital and other providers and incorporated findings in patient care. Assessment       ICD-10-CM ICD-9-CM    1. Atherosclerosis of native coronary artery of native heart with angina pectoris ([facility] [address] I25.119 414.01      413.9     stable   2. Vertebral artery stenosis, unspecified laterality I65.09 433.20     monitored   3. Essential hypertension, benign I10 401.1     controlled   4. Postsurgical percutaneous transluminal coronary angioplasty status Z98.61 V45.82    5. Orthostatic hypotension I95.1 458.0     on florinef   will keep bp on high side due to frequent drop-advised florinef daily  Advised to use lisinopril 20 mg a day instead of 40 mg-10/2017        No orders of the defined types were placed in this encounter. Follow-up Disposition:  Return in about 3 months (around 1/17/2018).

## 2017-10-17 NOTE — MR AVS SNAPSHOT
Visit Information Date & Time Provider Department Dept. Phone Encounter #  
 10/17/2017  1:15 PM Pasquale Mckeon MD [facility] [place] [id number]  Follow-up Instructions Return in about 3 months (around 1/17/2018). Upcoming Health Maintenance Date Due Hepatitis C Screening 1950 FOBT Q 1 YEAR AGE 50-75 7/7/2000 ZOSTER VACCINE AGE 60> 5/7/2010 GLAUCOMA SCREENING Q2Y 7/7/2015 Pneumococcal 65+ Low/Medium Risk (1 of 2 - PCV13) 7/7/2015 MEDICARE YEARLY EXAM 7/7/2015 INFLUENZA AGE 9 TO ADULT 8/1/2017 DTaP/Tdap/Td series (2 - Td) 9/6/2022 Allergies as of 10/17/2017  Review Complete On: 10/17/2017 By: Pasquale Mckeon MD  
  
 Severity Noted Reaction Type Reactions Codeine  09/06/2012    Other (comments) States keeps him awake Current Immunizations  Never Reviewed Name Date TDAP Vaccine 9/6/2012 11:06 AM  
  
 Not reviewed this visit You Were Diagnosed With   
  
 Codes Comments Atherosclerosis of native coronary artery of native heart with angina pectoris ([facility] [address]    -  Primary ICD-10-CM: I25.119 ICD-9-CM: 414.01, 413.9 stable Vertebral artery stenosis, unspecified laterality     ICD-10-CM: I65.09 
ICD-9-CM: 433.20 monitored Essential hypertension, benign     ICD-10-CM: I10 
ICD-9-CM: 401.1 controlled Postsurgical percutaneous transluminal coronary angioplasty status     ICD-10-CM: Z98.61 ICD-9-CM: V45.82 Orthostatic hypotension     ICD-10-CM: I95.1 ICD-9-CM: 458.0 on florinef Vitals BP Pulse Height(growth percentile) Weight(growth percentile) BMI Smoking Status 119/61 77 6' 2\" (1.88 m) 185 lb (83.9 kg) 23.75 kg/m2 Former Smoker Vitals History BMI and BSA Data Body Mass Index Body Surface Area  
 23.75 kg/m 2 2.09 m 2 Preferred Pharmacy Pharmacy Name Phone [facility] [address] [place] [address] AT [facility] [phone] Your Updated Medication List  
  
   
This list is accurate as of: 10/17/17  1:26 PM.  Always use your most recent med list.  
  
  
  
  
 acetaminophen 325 mg tablet Commonly known as:  TYLENOL Take  by mouth every four (4) hours as needed for Pain. amantadine HCl 100 mg capsule Commonly known as:  Dene Kays Take  by mouth two (2) times a day. aspirin 325 mg tablet Commonly known as:  ASPIRIN Take 325 mg by mouth daily. ATIVAN 1 mg tablet Generic drug:  LORazepam  
Take 2 mg by mouth every eight (8) hours as needed. atorvastatin 80 mg tablet Commonly known as:  LIPITOR Take 40 mg by mouth daily. AVODART 0.5 mg capsule Generic drug:  dutasteride Take 0.5 mg by mouth daily. carbidopa-levodopa-entacapone 37.5-150-200 mg tablet Commonly known as:  STALEVO Take 1 Tab by mouth four (4) times daily. -200  
  
 cephALEXin 500 mg capsule Commonly known as:  Elier Balboa Take 500 mg by mouth two (2) times a day. clopidogrel 75 mg Tab Commonly known as:  PLAVIX TAKE 1 TABLET BY MOUTH EVERY DAY Comp. Stocking,Thigh,Reg,Medium Misc  
1 Units by Does Not Apply route daily. fish oil-omega-3 fatty acids 340-1,000 mg capsule Take 1 Cap by mouth daily. fludrocortisone 0.1 mg tablet Commonly known as:  FLORINEF Take 1 Tab by mouth two (2) times a day. LISINOPRIL PO Take  by mouth daily. melatonin 10 mg Tab Take  by mouth. NEURONTIN 300 mg capsule Generic drug:  gabapentin Take 300 mg by mouth three (3) times daily. nitroglycerin 0.4 mg SL tablet Commonly known as:  NITROSTAT  
1 Tab by SubLINGual route every five (5) minutes as needed for Chest Pain for up to 3 doses. REMERON 30 mg tablet Generic drug:  mirtazapine Take  by mouth nightly. ZOLOFT 50 mg tablet Generic drug:  sertraline Take  by mouth two (2) times a day. Follow-up Instructions Return in about 3 months (around 1/17/2018). Introducing [facility]! Michael Silverman introduces [employer] patient portal. Now you can access parts of your medical record, email your doctor's office, and request medication refills online. 1. In your internet browser, go to [email or web address] 2. Click on the First Time User? Click Here link in the Sign In box. You will see the New Member Sign Up page. 3. Enter your [employer] Access Code exactly as it appears below. You will not need to use this code after youve completed the sign-up process. If you do not sign up before the expiration date, you must request a new code. · [employer] Access Code: [email or web address] Expires: 1/15/2018  1:26 PM 
 
4. Enter the last four digits of your Social Security Number (xxxx) and Date of Birth (mm/dd/yyyy) as indicated and click Submit. You will be taken to the next sign-up page. 5. Create a [employer] ID. This will be your [employer] login ID and cannot be changed, so think of one that is secure and easy to remember. 6. Create a [employer] password. You can change your password at any time. 7. Enter your Password Reset Question and Answer. This can be used at a later time if you forget your password. 8. Enter your e-mail address. You will receive e-mail notification when new information is available in [address] 9. Click Sign Up. You can now view and download portions of your medical record. 10. Click the Download Summary menu link to download a portable copy of your medical information. If you have questions, please visit the Frequently Asked Questions section of the [employer] website. Remember, [employer] is NOT to be used for urgent needs. For medical emergencies, dial 911. Now available from your iPhone and Android! Please provide this summary of care documentation to your next provider. Your primary care clinician is listed as Martha Ramirez.  If you have any questions after today's visit, please call [phone].

## 2017-10-17 NOTE — PROGRESS NOTES
1. Have you been to the ER, urgent care clinic since your last visit? Hospitalized since your last visit? Yes  Where[facility] Reason for visit: fainting    2. Have you seen or consulted any other health care providers outside of the [address] [place] [address] since your last visit? Include any pap smears or colon screening. No     3. Since your last visit, have you had any of the following symptoms?    no

## 2017-11-09 ENCOUNTER — TELEPHONE (OUTPATIENT)
Dept: CARDIOLOGY CLINIC | Age: 67
End: 2017-11-09

## 2017-11-09 DIAGNOSIS — I10 ESSENTIAL HYPERTENSION, BENIGN: ICD-10-CM

## 2017-11-09 DIAGNOSIS — I10 ESSENTIAL HYPERTENSION, BENIGN: Primary | ICD-10-CM

## 2017-11-09 NOTE — TELEPHONE ENCOUNTER
Patient would like to know if he should continue to take lisinopril.  He was told to stop it by his pcp, but stated at the last cardiology appointment he was told to take it and it was increase to 20 mg

## 2017-11-10 DIAGNOSIS — I10 ESSENTIAL HYPERTENSION, BENIGN: Primary | ICD-10-CM

## 2017-11-10 RX ORDER — LISINOPRIL 20 MG/1
20 TABLET ORAL DAILY
Qty: 30 TAB | Refills: 5 | Status: SHIPPED | OUTPATIENT
Start: 2017-11-10 | End: 2018-05-01 | Stop reason: SDUPTHER

## 2017-11-10 RX ORDER — LISINOPRIL 20 MG/1
20 TABLET ORAL DAILY
COMMUNITY
End: 2017-11-10 | Stop reason: SDUPTHER

## 2018-02-06 ENCOUNTER — OFFICE VISIT (OUTPATIENT)
Dept: ORTHOPEDIC SURGERY | Age: 68
End: 2018-02-06

## 2018-02-06 VITALS
HEIGHT: 74 IN | HEART RATE: 68 BPM | SYSTOLIC BLOOD PRESSURE: 145 MMHG | BODY MASS INDEX: 25.21 KG/M2 | WEIGHT: 196.4 LBS | RESPIRATION RATE: 16 BRPM | DIASTOLIC BLOOD PRESSURE: 70 MMHG

## 2018-02-06 DIAGNOSIS — G20 PARKINSON'S DISEASE (HCC): ICD-10-CM

## 2018-02-06 DIAGNOSIS — M51.16 LUMBAR DISC DISEASE WITH RADICULOPATHY: ICD-10-CM

## 2018-02-06 DIAGNOSIS — M17.0 ARTHRITIS OF BOTH KNEES: ICD-10-CM

## 2018-02-06 DIAGNOSIS — M25.561 PAIN IN BOTH KNEES, UNSPECIFIED CHRONICITY: Primary | ICD-10-CM

## 2018-02-06 DIAGNOSIS — M25.562 PAIN IN BOTH KNEES, UNSPECIFIED CHRONICITY: Primary | ICD-10-CM

## 2018-02-06 NOTE — PROGRESS NOTES
Vitals:    02/06/18 1442   BP: 145/70   Pulse: 68   Resp: 16   Weight: 89.1 kg (196 lb 6.4 oz)   Height: 6' 2\" (1.88 m)   PainSc:   5   PainLoc: Knee       Patient Active Problem List   Diagnosis Code    Coronary atherosclerosis of native coronary artery I25.10    Postsurgical percutaneous transluminal coronary angioplasty status Z98.61    Essential hypertension, benign I10    Parkinson disease ([facility] [address] G20    Hyperlipidemia E78.5    Hypotension, unspecified I95.9    Vertebral artery stenosis I65.09    Orthostatic hypotension I95.1    Atherosclerosis of native artery of both lower extremities with intermittent claudication ([facility]) I70.213     Patient Active Problem List    Diagnosis Date Noted    Atherosclerosis of native artery of both lower extremities with intermittent claudication ([facility] 01/19/2017    Vertebral artery stenosis 07/08/2016    Orthostatic hypotension 07/08/2016    Coronary atherosclerosis of native coronary artery 03/07/2014    Postsurgical percutaneous transluminal coronary angioplasty status 03/07/2014    Essential hypertension, benign 03/07/2014    [facility] 03/07/2014    Hyperlipidemia 03/07/2014    Hypotension, unspecified 03/07/2014     Current Outpatient Prescriptions   Medication Sig Dispense Refill    lisinopril (PRINIVIL, ZESTRIL) 20 mg tablet Take 1 Tab by mouth daily. 30 Tab 5    aspirin (ASPIRIN) 325 mg tablet Take 325 mg by mouth daily.  nitroglycerin (NITROSTAT) 0.4 mg SL tablet 1 Tab by SubLINGual route every five (5) minutes as needed for Chest Pain for up to 3 doses. 30 Tab 0    fludrocortisone (FLORINEF) 0.1 mg tablet Take 1 Tab by mouth two (2) times a day. 180 Tab 3    cephALEXin (KEFLEX) 500 mg capsule Take 500 mg by mouth two (2) times a day.  melatonin 10 mg tab Take  by mouth.  fish oil-omega-3 fatty acids 340-1,000 mg capsule Take 1 Cap by mouth daily.       clopidogrel (PLAVIX) 75 mg tab TAKE 1 TABLET BY MOUTH EVERY DAY 30 Tab 6    Comp. Stocking,Thigh,Reg,Medium misc 1 Units by Does Not Apply route daily. 1 Units 6    mirtazapine (REMERON) 30 mg tablet Take  by mouth nightly.  acetaminophen (TYLENOL) 325 mg tablet Take  by mouth every four (4) hours as needed for Pain.  sertraline (ZOLOFT) 50 mg tablet Take  by mouth two (2) times a day.  gabapentin (NEURONTIN) 300 mg capsule Take 300 mg by mouth three (3) times daily.  dutasteride (AVODART) 0.5 mg capsule Take 0.5 mg by mouth daily.  amantadine HCl (SYMMETREL) 100 mg capsule Take  by mouth two (2) times a day.  atorvastatin (LIPITOR) 80 mg tablet Take 40 mg by mouth daily.  LORazepam (ATIVAN) 1 mg tablet Take 2 mg by mouth every eight (8) hours as needed.  carbidopa-levodopa-entacapone (STALEVO) 37.5-150-200 mg tablet Take 1 Tab by mouth four (4) times daily. -200       Allergies   Allergen Reactions    Codeine Other (comments)     States keeps him awake    Propoxyphene Other (comments)     Past Medical History:   Diagnosis Date    CAD (coronary artery disease)     Coronary atherosclerosis of native coronary artery 3/7/2014    stable  old pci sob on exertion. r/o ischemia or cmp     Essential hypertension, benign 3/7/2014    old pci's     Hypotension, unspecified 3/7/2014    old pci's     Other and unspecified hyperlipidemia 3/7/2014    old pci's     Parkinson disease ([facility] [address] 3/7/2014    old pci's     Parkinson's disease ([facility] [address]     Postsurgical percutaneous transluminal coronary angioplasty status 3/7/2014    old pci's      Past Surgical History:   Procedure Laterality Date    CARDIAC SURG PROCEDURE UNLIST      stents    HX CERVICAL FUSION      HX HERNIA REPAIR Left 12/3/13    [place]    HX OTHER SURGICAL      deep brain stimulator     Family History   Problem Relation Age of Onset    Heart Disease Other      Social History   Substance Use Topics    Smoking status: Former Smoker     Quit date: 2/8/2016    Smokeless tobacco: Never Used    Alcohol use Yes      Comment: occasional                                             Vitals:    02/06/18 1442   BP: 145/70   Pulse: 68   Resp: 16   Weight: 89.1 kg (196 lb 6.4 oz)   Height: 6' 2\" (1.88 m)   PainSc:   5   PainLoc: Knee       Patient Active Problem List   Diagnosis Code    Coronary atherosclerosis of native coronary artery I25.10    Postsurgical percutaneous transluminal coronary angioplasty status Z98.61    Essential hypertension, benign I10    Parkinson disease ([facility] [address] G20    Hyperlipidemia E78.5    Hypotension, unspecified I95.9    Vertebral artery stenosis I65.09    Orthostatic hypotension I95.1    Atherosclerosis of native artery of both lower extremities with intermittent claudication ([facility] [address] I70.213     Patient Active Problem List    Diagnosis Date Noted    Atherosclerosis of native artery of both lower extremities with intermittent claudication ([facility] [address] 01/19/2017    Vertebral artery stenosis 07/08/2016    Orthostatic hypotension 07/08/2016    Coronary atherosclerosis of native coronary artery 03/07/2014    Postsurgical percutaneous transluminal coronary angioplasty status 03/07/2014    Essential hypertension, benign 03/07/2014    Parkinson disease ([facility] [address] 03/07/2014    Hyperlipidemia 03/07/2014    Hypotension, unspecified 03/07/2014     Current Outpatient Prescriptions   Medication Sig Dispense Refill    lisinopril (PRINIVIL, ZESTRIL) 20 mg tablet Take 1 Tab by mouth daily. 30 Tab 5    aspirin (ASPIRIN) 325 mg tablet Take 325 mg by mouth daily.  nitroglycerin (NITROSTAT) 0.4 mg SL tablet 1 Tab by SubLINGual route every five (5) minutes as needed for Chest Pain for up to 3 doses. 30 Tab 0    fludrocortisone (FLORINEF) 0.1 mg tablet Take 1 Tab by mouth two (2) times a day. 180 Tab 3    cephALEXin (KEFLEX) 500 mg capsule Take 500 mg by mouth two (2) times a day.  melatonin 10 mg tab Take  by mouth.       fish oil-omega-3 fatty acids 340-1,000 mg capsule Take 1 Cap by mouth daily.  clopidogrel (PLAVIX) 75 mg tab TAKE 1 TABLET BY MOUTH EVERY DAY 30 Tab 6    Comp. Stocking,Thigh,Reg,Medium misc 1 Units by Does Not Apply route daily. 1 Units 6    mirtazapine (REMERON) 30 mg tablet Take  by mouth nightly.  acetaminophen (TYLENOL) 325 mg tablet Take  by mouth every four (4) hours as needed for Pain.  sertraline (ZOLOFT) 50 mg tablet Take  by mouth two (2) times a day.  gabapentin (NEURONTIN) 300 mg capsule Take 300 mg by mouth three (3) times daily.  dutasteride (AVODART) 0.5 mg capsule Take 0.5 mg by mouth daily.  amantadine HCl (SYMMETREL) 100 mg capsule Take  by mouth two (2) times a day.  atorvastatin (LIPITOR) 80 mg tablet Take 40 mg by mouth daily.  LORazepam (ATIVAN) 1 mg tablet Take 2 mg by mouth every eight (8) hours as needed.  carbidopa-levodopa-entacapone (STALEVO) 37.5-150-200 mg tablet Take 1 Tab by mouth four (4) times daily. -200       Allergies   Allergen Reactions    Codeine Other (comments)     States keeps him awake    Propoxyphene Other (comments)     Past Medical History:   Diagnosis Date    CAD (coronary artery disease)     Coronary atherosclerosis of native coronary artery 3/7/2014    stable  old pci sob on exertion. r/o ischemia or cmp     Essential hypertension, benign 3/7/2014    old pci's     Hypotension, unspecified 3/7/2014    old pci's     Other and unspecified hyperlipidemia 3/7/2014    old pci's     Parkinson disease ([facility] [address] 3/7/2014    old pci's     Parkinson's disease ([facility] [address]     Postsurgical percutaneous transluminal coronary angioplasty status 3/7/2014    old pci's      Past Surgical History:   Procedure Laterality Date    CARDIAC SURG PROCEDURE UNLIST      stents    HX CERVICAL FUSION      HX HERNIA REPAIR Left 12/3/13    [place]    HX OTHER SURGICAL      deep brain stimulator     Family History   Problem Relation Age of Onset    Heart Disease Other Social History   Substance Use Topics    Smoking status: Former Smoker     Quit date: 2/8/2016    Smokeless tobacco: Never Used    Alcohol use Yes      Comment: occasional                                          2HISTORY OF PRESENT ILLNESS: Mr. Suman Booker is a 26-year-old gentleman who has Parkinsons disease who is here for consultation regarding bilateral knee pain. On further questioning he notes more weakness in the left lower extremity with some numbness in the left leg and he feels like the left leg will give out on him. He does notice some pain in his knees but states today it is not very bad. He points more to pain in the medial aspect of both knees. He has not had recent x-rays of his knees. He does use a Roll-aid for ambulation assistance. He does not describe mechanical locking symptoms of his knees. He does not wear a brace on his knees. He is on Plavix medications and is unable to take anti-inflammatory medication. PHYSICAL EXAMINATION:  Reveals a thin, 79year-old gentleman in minimal discomfort. He does rely on his Roll-aid for ambulation assistance due to his Parkinsons disease. With reference to his knees, he has no effusion of either the right knee or the left knee. He has good range of motion of both knees from full extension to flexion of about 120º bilaterally. He has slight palpable medial joint line tenderness bilaterally. Aruns test is negative. Lachman test is negative. He has good collateral, as well as cruciate ligament stability of the knees. Anterior and posterior drawer tests are negative. He has no opening to varus or valgus stress testing in full extension or 30º of flexion with either knee. He has no left calf tenderness or swelling bilaterally. He has fair quadriceps tone bilaterally about 4+/5. Neurovascular testing is intact to the lower extremities, distally and proximally.     RADIOGRAPHS:  X-rays of both knees taken today reveal mild degenerative changes. He still has adequate joint space remaining in the weightbearing portion of both knees. He does not have an obvious deformity of his knees radiographically. IMPRESSION:   1. Mild osteoarthritis, both knees. 2. Lumbar disc disease with radiculopathy by history. 3. Parkinsons disease. RECOMMENDATIONS:  I am not going to place him on any anti-inflammatory medications due to the fact that he is on Plavix, and really his pain is not as much of an issue for him as it is the weakness, in particularly his left leg. I have recommended a course of physical therapy for gait analysis and motor strengthening of his lower extremities. He will pursue about a three to four week course of therapy and return to see me as needed. All of his questions were answered today.                Vitals:    02/06/18 1442   BP: 145/70   Pulse: 68   Resp: 16   Weight: 89.1 kg (196 lb 6.4 oz)   Height: 6' 2\" (1.88 m)   PainSc:   5   PainLoc: Knee       Patient Active Problem List   Diagnosis Code    Coronary atherosclerosis of native coronary artery I25.10    Postsurgical percutaneous transluminal coronary angioplasty status Z98.61    Essential hypertension, benign I10    Parkinson disease ([address] G20    Hyperlipidemia E78.5    Hypotension, unspecified I95.9    Vertebral artery stenosis I65.09    Orthostatic hypotension I95.1    Atherosclerosis of native artery of both lower extremities with intermittent claudication ([address] I70.213     Patient Active Problem List    Diagnosis Date Noted    Atherosclerosis of native artery of both lower extremities with intermittent claudication ([address] 01/19/2017    Vertebral artery stenosis 07/08/2016    Orthostatic hypotension 07/08/2016    Coronary atherosclerosis of native coronary artery 03/07/2014    Postsurgical percutaneous transluminal coronary angioplasty status 03/07/2014    Essential hypertension, benign 03/07/2014    Parkinson disease ([address] 03/07/2014    Hyperlipidemia 03/07/2014    Hypotension, unspecified 03/07/2014     Current Outpatient Prescriptions   Medication Sig Dispense Refill    MULTIVIT-MINERALS/FA/LYCOPENE (ONE-A-DAY MEN'S PO) Take  by mouth.  melatonin 10 mg tab Take  by mouth.  fish oil-omega-3 fatty acids 340-1,000 mg capsule Take 1 Cap by mouth daily.  clopidogrel (PLAVIX) 75 mg tab TAKE 1 TABLET BY MOUTH EVERY DAY 30 Tab 6    Comp. Stocking,Thigh,Reg,Medium misc 1 Units by Does Not Apply route daily. 1 Units 6    mirtazapine (REMERON) 30 mg tablet Take  by mouth nightly.  acetaminophen (TYLENOL) 325 mg tablet Take  by mouth every four (4) hours as needed for Pain.  sertraline (ZOLOFT) 50 mg tablet Take  by mouth two (2) times a day.  gabapentin (NEURONTIN) 300 mg capsule Take 300 mg by mouth three (3) times daily.  dutasteride (AVODART) 0.5 mg capsule Take 0.5 mg by mouth daily.  amantadine HCl (SYMMETREL) 100 mg capsule Take  by mouth two (2) times a day.  atorvastatin (LIPITOR) 80 mg tablet Take 40 mg by mouth daily.  LORazepam (ATIVAN) 1 mg tablet Take 2 mg by mouth every eight (8) hours as needed.  carbidopa-levodopa-entacapone (STALEVO) 37.5-150-200 mg tablet Take 1 Tab by mouth four (4) times daily. -200      lisinopril (PRINIVIL, ZESTRIL) 20 mg tablet Take 1 Tab by mouth daily. 30 Tab 5    aspirin (ASPIRIN) 325 mg tablet Take 325 mg by mouth daily.  nitroglycerin (NITROSTAT) 0.4 mg SL tablet 1 Tab by SubLINGual route every five (5) minutes as needed for Chest Pain for up to 3 doses. 30 Tab 0    fludrocortisone (FLORINEF) 0.1 mg tablet Take 1 Tab by mouth two (2) times a day. 180 Tab 3    cephALEXin (KEFLEX) 500 mg capsule Take 500 mg by mouth two (2) times a day.        Allergies   Allergen Reactions    Codeine Other (comments)     States keeps him awake    Propoxyphene Other (comments)     Past Medical History:   Diagnosis Date    CAD (coronary artery disease)     Coronary atherosclerosis of native coronary artery 3/7/2014    stable  old pci sob on exertion. r/o ischemia or cmp     Essential hypertension, benign 3/7/2014    old pci's     Hypotension, unspecified 3/7/2014    old pci's     Other and unspecified hyperlipidemia 3/7/2014    old pci's     Parkinson disease ([address] 3/7/2014    old pci's     Parkinson's disease ([address]     Postsurgical percutaneous transluminal coronary angioplasty status 3/7/2014    old pci's      Past Surgical History:   Procedure Laterality Date    CARDIAC SURG PROCEDURE UNLIST      stents    HX CERVICAL FUSION      HX HERNIA REPAIR Left 12/3/13    [place]    HX OTHER SURGICAL      deep brain stimulator     Family History   Problem Relation Age of Onset    Heart Disease Other      Social History   Substance Use Topics    Smoking status: Former Smoker     Quit date: 2/8/2016    Smokeless tobacco: Never Used    Alcohol use Yes      Comment: occasional                                                 Vitals:    02/06/18 1442   BP: 145/70   Pulse: 68   Resp: 16   Weight: 89.1 kg (196 lb 6.4 oz)   Height: 6' 2\" (1.88 m)   PainSc:   5   PainLoc: Knee       Patient Active Problem List   Diagnosis Code    Coronary atherosclerosis of native coronary artery I25.10    Postsurgical percutaneous transluminal coronary angioplasty status Z98.61    Essential hypertension, benign I10    Parkinson disease ([address] G20    Hyperlipidemia E78.5    Hypotension, unspecified I95.9    Vertebral artery stenosis I65.09    Orthostatic hypotension I95.1    Atherosclerosis of native artery of both lower extremities with intermittent claudication ([address] I70.213     Patient Active Problem List    Diagnosis Date Noted    Atherosclerosis of native artery of both lower extremities with intermittent claudication ([address] 01/19/2017    Vertebral artery stenosis 07/08/2016    Orthostatic hypotension 07/08/2016    Coronary atherosclerosis of native coronary artery 03/07/2014    Postsurgical percutaneous transluminal coronary angioplasty status 03/07/2014    Essential hypertension, benign 03/07/2014    Parkinson disease ([facility] [address] 03/07/2014    Hyperlipidemia 03/07/2014    Hypotension, unspecified 03/07/2014     Current Outpatient Prescriptions   Medication Sig Dispense Refill    MULTIVIT-MINERALS/FA/LYCOPENE (ONE-A-DAY MEN'S PO) Take  by mouth.  melatonin 10 mg tab Take  by mouth.  fish oil-omega-3 fatty acids 340-1,000 mg capsule Take 1 Cap by mouth daily.  clopidogrel (PLAVIX) 75 mg tab TAKE 1 TABLET BY MOUTH EVERY DAY 30 Tab 6    Comp. Stocking,Thigh,Reg,Medium misc 1 Units by Does Not Apply route daily. 1 Units 6    mirtazapine (REMERON) 30 mg tablet Take  by mouth nightly.  acetaminophen (TYLENOL) 325 mg tablet Take  by mouth every four (4) hours as needed for Pain.  sertraline (ZOLOFT) 50 mg tablet Take  by mouth two (2) times a day.  gabapentin (NEURONTIN) 300 mg capsule Take 300 mg by mouth three (3) times daily.  dutasteride (AVODART) 0.5 mg capsule Take 0.5 mg by mouth daily.  amantadine HCl (SYMMETREL) 100 mg capsule Take  by mouth two (2) times a day.  atorvastatin (LIPITOR) 80 mg tablet Take 40 mg by mouth daily.  LORazepam (ATIVAN) 1 mg tablet Take 2 mg by mouth every eight (8) hours as needed.  carbidopa-levodopa-entacapone (STALEVO) 37.5-150-200 mg tablet Take 1 Tab by mouth four (4) times daily. -200      lisinopril (PRINIVIL, ZESTRIL) 20 mg tablet Take 1 Tab by mouth daily. 30 Tab 5    aspirin (ASPIRIN) 325 mg tablet Take 325 mg by mouth daily.  nitroglycerin (NITROSTAT) 0.4 mg SL tablet 1 Tab by SubLINGual route every five (5) minutes as needed for Chest Pain for up to 3 doses. 30 Tab 0    fludrocortisone (FLORINEF) 0.1 mg tablet Take 1 Tab by mouth two (2) times a day. 180 Tab 3    cephALEXin (KEFLEX) 500 mg capsule Take 500 mg by mouth two (2) times a day.        Allergies   Allergen Reactions    Codeine Other (comments)     States keeps him awake    Propoxyphene Other (comments)     Past Medical History:   Diagnosis Date    CAD (coronary artery disease)     Coronary atherosclerosis of native coronary artery 3/7/2014    stable  old pci sob on exertion. r/o ischemia or cmp     Essential hypertension, benign 3/7/2014    old pci's     Hypotension, unspecified 3/7/2014    old pci's     Other and unspecified hyperlipidemia 3/7/2014    old pci's     Parkinson disease ([address] 3/7/2014    old pci's     Parkinson's disease ([address]     Postsurgical percutaneous transluminal coronary angioplasty status 3/7/2014    old pci's      Past Surgical History:   Procedure Laterality Date    CARDIAC SURG PROCEDURE UNLIST      stents    HX CERVICAL FUSION      HX HERNIA REPAIR Left 12/3/13    [place]    HX OTHER SURGICAL      deep brain stimulator     Family History   Problem Relation Age of Onset    Heart Disease Other      Social History   Substance Use Topics    Smoking status: Former Smoker     Quit date: 2/8/2016    Smokeless tobacco: Never Used    Alcohol use Yes      Comment: occasional

## 2018-02-06 NOTE — MR AVS SNAPSHOT
Anusha Pulse 
 
 
 [address] [facility] 
[phone] Patient: Trina Kelley MRN: [id number] XNU:1[birth year]0/1145 Visit Information Date & Time Provider Department Dept. Phone Encounter #  
 2/6/2018  3:00 PM Karen Roche MD 4 [facility], [address] [facility] [place] [phone] Follow-up Instructions Return if symptoms worsen or fail to improve. Your Appointments 4/26/2018 11:15 AM  
Follow Up with Lexie Reynolds MD  
[facility] ([facility]) Appt Note: 6 month follow up; 6 month follow up  
 [address] [facility] [address]  
  
   
 [address] [place] [address] Upcoming Health Maintenance Date Due Hepatitis C Screening 1950 FOBT Q 1 YEAR AGE 50-75 7/7/2000 ZOSTER VACCINE AGE 60> 5/7/2010 GLAUCOMA SCREENING Q2Y 7/7/2015 Pneumococcal 65+ Low/Medium Risk (1 of 2 - PCV13) 7/7/2015 MEDICARE YEARLY EXAM 7/7/2015 Influenza Age 5 to Adult 8/1/2017 DTaP/Tdap/Td series (2 - Td) 9/6/2022 Allergies as of 2/6/2018  Review Complete On: 2/6/2018 By: Karen Roche MD  
  
 Severity Noted Reaction Type Reactions Codeine  09/06/2012    Other (comments) States keeps him awake Propoxyphene  11/02/2010    Other (comments) Current Immunizations  Never Reviewed Name Date TDAP Vaccine 9/6/2012 11:06 AM  
  
 Not reviewed this visit You Were Diagnosed With   
  
 Codes Comments Pain in both knees, unspecified chronicity    -  Primary ICD-10-CM: M25.561, M25.562 ICD-9-CM: 719.46 Arthritis of both knees     ICD-10-CM: M17.0 ICD-9-CM: 716.96 Lumbar disc disease with radiculopathy     ICD-10-CM: M51.16 
ICD-9-CM: 722.10 Parkinson's disease ([facility] [address]     ICD-10-CM: G20 
ICD-9-CM: 332.0 Vitals BP Pulse Resp Height(growth percentile) Weight(growth percentile) BMI 145/70 68 16 6' 2\" (1.88 m) 196 lb 6.4 oz (89.1 kg) 25.22 kg/m2 Smoking Status Former Smoker BMI and BSA Data Body Mass Index Body Surface Area  
 25.22 kg/m 2 2.16 m 2 Preferred Pharmacy Pharmacy Name Phone [facility]/PHARMACY #[id number] [address] Your Updated Medication List  
  
   
This list is accurate as of: 2/6/18  3:21 PM.  Always use your most recent med list.  
  
  
  
  
 acetaminophen 325 mg tablet Commonly known as:  TYLENOL Take  by mouth every four (4) hours as needed for Pain. amantadine HCl 100 mg capsule Commonly known as:  [place] Take  by mouth two (2) times a day. aspirin 325 mg tablet Commonly known as:  ASPIRIN Take 325 mg by mouth daily. ATIVAN 1 mg tablet Generic drug:  LORazepam  
Take 2 mg by mouth every eight (8) hours as needed. atorvastatin 80 mg tablet Commonly known as:  LIPITOR Take 40 mg by mouth daily. AVODART 0.5 mg capsule Generic drug:  dutasteride Take 0.5 mg by mouth daily. carbidopa-levodopa-entacapone 37.5-150-200 mg tablet Commonly known as:  STALEVO Take 1 Tab by mouth four (4) times daily. -200  
  
 cephALEXin 500 mg capsule Commonly known as:  Lugene Galt Take 500 mg by mouth two (2) times a day. clopidogrel 75 mg Tab Commonly known as:  PLAVIX TAKE 1 TABLET BY MOUTH EVERY DAY Comp. Stocking,Thigh,Reg,Medium Misc  
1 Units by Does Not Apply route daily. fish oil-omega-3 fatty acids 340-1,000 mg capsule Take 1 Cap by mouth daily. fludrocortisone 0.1 mg tablet Commonly known as:  FLORINEF Take 1 Tab by mouth two (2) times a day. lisinopril 20 mg tablet Commonly known as:  Loralie Wadsworth Take 1 Tab by mouth daily. melatonin 10 mg Tab Take  by mouth. NEURONTIN 300 mg capsule Generic drug:  gabapentin Take 300 mg by mouth three (3) times daily. nitroglycerin 0.4 mg SL tablet Commonly known as:  NITROSTAT  
1 Tab by SubLINGual route every five (5) minutes as needed for Chest Pain for up to 3 doses. ONE-A-DAY MEN'S PO Take  by mouth. REMERON 30 mg tablet Generic drug:  mirtazapine Take  by mouth nightly. ZOLOFT 50 mg tablet Generic drug:  sertraline Take  by mouth two (2) times a day. We Performed the Following AMB POC XRAY, KNEE; 1/2 VIEWS [06843 CPT(R)] AMB POC XRAY, KNEE; 1/2 VIEWS [20075 CPT(R)] REFERRAL TO PHYSICAL THERAPY [GNZ03 Custom] Comments: EVAL AND TREAT 
GAIT ANALYSIS 
BLE STRENGTHENING 
2-3/ 3-4 WEEKS 
BLE 
[facility] [address] Follow-up Instructions Return if symptoms worsen or fail to improve. Referral Information Referral ID Referred By Referred To  
  
 [id number] Saundra Goldnorma DODSON Not Available Visits Status Start Date End Date 1 New Request 2/6/18 2/6/19 If your referral has a status of pending review or denied, additional information will be sent to support the outcome of this decision. Introducing [facility] & HEALTH SERVICES! [facility] introduces [employer]oom[employer] patient portal. Now you can access parts of your medical record, email your doctor's office, and request medication refills online. 1. In your internet browser, go to [email or web address] 2. Click on the First Time User? Click Here link in the Sign In box. You will see the New Member Sign Up page. 3. Enter your ZoomForth Access Code exactly as it appears below. You will not need to use this code after youve completed the sign-up process. If you do not sign up before the expiration date, you must request a new code. · ZoomForth Access Code: [email or web address] Expires: 5/7/2018  2:31 PM 
 
4. Enter the last four digits of your Social Security Number (xxxx) and Date of Birth (mm/dd/yyyy) as indicated and click Submit. You will be taken to the next sign-up page. 5. Create a Zweemie ID. This will be your Zweemie login ID and cannot be changed, so think of one that is secure and easy to remember. 6. Create a Zweemie password. You can change your password at any time. 7. Enter your Password Reset Question and Answer. This can be used at a later time if you forget your password. 8. Enter your e-mail address. You will receive e-mail notification when new information is available in [address] 9. Click Sign Up. You can now view and download portions of your medical record. 10. Click the Download Summary menu link to download a portable copy of your medical information. If you have questions, please visit the Frequently Asked Questions section of the Zweemie website. Remember, Zweemie is NOT to be used for urgent needs. For medical emergencies, dial 911. Now available from your iPhone and Android! Please provide this summary of care documentation to your next provider. Your primary care clinician is listed as Red Kerwin. If you have any questions after today's visit, please call [phone].

## 2018-02-09 ENCOUNTER — TELEPHONE (OUTPATIENT)
Dept: ORTHOPEDIC SURGERY | Age: 68
End: 2018-02-09

## 2018-02-09 ENCOUNTER — HOME HEALTH ADMISSION (OUTPATIENT)
Dept: HOME HEALTH SERVICES | Facility: HOME HEALTH | Age: 68
End: 2018-02-09
Payer: MEDICARE

## 2018-02-09 DIAGNOSIS — G20 PARKINSON'S DISEASE (HCC): Primary | ICD-10-CM

## 2018-02-09 DIAGNOSIS — M51.16 LUMBAR DISC DISEASE WITH RADICULOPATHY: ICD-10-CM

## 2018-02-09 DIAGNOSIS — M17.0 ARTHRITIS OF BOTH KNEES: ICD-10-CM

## 2018-02-09 NOTE — TELEPHONE ENCOUNTER
I have received a call from Thania at [facility] regarding the physical therapy order. She states due to Parkinson's disease Mr Madison Johnson is not able to ambulate from the home too often. He has been in therapy and has proven weaknesses. Would home therapy be a better option due to the patient's current condition? If so, please enter another order for processing. Thank you. The physical therapy request from 02/06/18 has been closed.

## 2018-02-12 ENCOUNTER — HOME CARE VISIT (OUTPATIENT)
Dept: SCHEDULING | Facility: HOME HEALTH | Age: 68
End: 2018-02-12
Payer: MEDICARE

## 2018-02-12 ENCOUNTER — HOME CARE VISIT (OUTPATIENT)
Dept: HOME HEALTH SERVICES | Facility: HOME HEALTH | Age: 68
End: 2018-02-12
Payer: MEDICARE

## 2018-02-12 VITALS
HEART RATE: 77 BPM | DIASTOLIC BLOOD PRESSURE: 80 MMHG | RESPIRATION RATE: 17 BRPM | TEMPERATURE: 97.5 F | SYSTOLIC BLOOD PRESSURE: 120 MMHG

## 2018-02-12 PROCEDURE — 3331090002 HH PPS REVENUE DEBIT

## 2018-02-12 PROCEDURE — 400013 HH SOC

## 2018-02-12 PROCEDURE — 3331090001 HH PPS REVENUE CREDIT

## 2018-02-12 PROCEDURE — G0151 HHCP-SERV OF PT,EA 15 MIN: HCPCS

## 2018-02-13 ENCOUNTER — HOME CARE VISIT (OUTPATIENT)
Dept: HOME HEALTH SERVICES | Facility: HOME HEALTH | Age: 68
End: 2018-02-13
Payer: MEDICARE

## 2018-02-13 PROCEDURE — 3331090001 HH PPS REVENUE CREDIT

## 2018-02-13 PROCEDURE — 3331090002 HH PPS REVENUE DEBIT

## 2018-02-14 ENCOUNTER — HOME CARE VISIT (OUTPATIENT)
Dept: HOME HEALTH SERVICES | Facility: HOME HEALTH | Age: 68
End: 2018-02-14
Payer: MEDICARE

## 2018-02-14 PROCEDURE — 3331090002 HH PPS REVENUE DEBIT

## 2018-02-14 PROCEDURE — 3331090001 HH PPS REVENUE CREDIT

## 2018-02-15 ENCOUNTER — HOME CARE VISIT (OUTPATIENT)
Dept: HOME HEALTH SERVICES | Facility: HOME HEALTH | Age: 68
End: 2018-02-15
Payer: MEDICARE

## 2018-02-15 PROCEDURE — 3331090002 HH PPS REVENUE DEBIT

## 2018-02-15 PROCEDURE — 3331090001 HH PPS REVENUE CREDIT

## 2018-02-15 PROCEDURE — G0157 HHC PT ASSISTANT EA 15: HCPCS

## 2018-02-16 PROCEDURE — 3331090001 HH PPS REVENUE CREDIT

## 2018-02-16 PROCEDURE — 3331090002 HH PPS REVENUE DEBIT

## 2018-02-17 PROCEDURE — 3331090001 HH PPS REVENUE CREDIT

## 2018-02-17 PROCEDURE — 3331090002 HH PPS REVENUE DEBIT

## 2018-02-18 VITALS
SYSTOLIC BLOOD PRESSURE: 138 MMHG | HEART RATE: 74 BPM | TEMPERATURE: 98.8 F | DIASTOLIC BLOOD PRESSURE: 66 MMHG | OXYGEN SATURATION: 98 %

## 2018-02-18 PROCEDURE — 3331090001 HH PPS REVENUE CREDIT

## 2018-02-18 PROCEDURE — 3331090002 HH PPS REVENUE DEBIT

## 2018-02-19 PROCEDURE — 3331090001 HH PPS REVENUE CREDIT

## 2018-02-19 PROCEDURE — 3331090002 HH PPS REVENUE DEBIT

## 2018-02-20 ENCOUNTER — HOME CARE VISIT (OUTPATIENT)
Dept: SCHEDULING | Facility: HOME HEALTH | Age: 68
End: 2018-02-20
Payer: MEDICARE

## 2018-02-20 VITALS
OXYGEN SATURATION: 98 % | HEART RATE: 74 BPM | TEMPERATURE: 98.6 F | DIASTOLIC BLOOD PRESSURE: 68 MMHG | SYSTOLIC BLOOD PRESSURE: 138 MMHG

## 2018-02-20 PROCEDURE — 3331090002 HH PPS REVENUE DEBIT

## 2018-02-20 PROCEDURE — G0157 HHC PT ASSISTANT EA 15: HCPCS

## 2018-02-20 PROCEDURE — 3331090001 HH PPS REVENUE CREDIT

## 2018-02-21 PROCEDURE — 3331090002 HH PPS REVENUE DEBIT

## 2018-02-21 PROCEDURE — 3331090001 HH PPS REVENUE CREDIT

## 2018-02-22 ENCOUNTER — HOME CARE VISIT (OUTPATIENT)
Dept: SCHEDULING | Facility: HOME HEALTH | Age: 68
End: 2018-02-22
Payer: MEDICARE

## 2018-02-22 PROCEDURE — 3331090001 HH PPS REVENUE CREDIT

## 2018-02-22 PROCEDURE — 3331090002 HH PPS REVENUE DEBIT

## 2018-02-22 PROCEDURE — G0157 HHC PT ASSISTANT EA 15: HCPCS

## 2018-02-23 VITALS
DIASTOLIC BLOOD PRESSURE: 80 MMHG | HEART RATE: 75 BPM | SYSTOLIC BLOOD PRESSURE: 143 MMHG | TEMPERATURE: 98.9 F | OXYGEN SATURATION: 98 %

## 2018-02-23 PROCEDURE — 3331090002 HH PPS REVENUE DEBIT

## 2018-02-23 PROCEDURE — 3331090001 HH PPS REVENUE CREDIT

## 2018-02-24 PROCEDURE — 3331090002 HH PPS REVENUE DEBIT

## 2018-02-24 PROCEDURE — 3331090001 HH PPS REVENUE CREDIT

## 2018-02-25 PROCEDURE — 3331090001 HH PPS REVENUE CREDIT

## 2018-02-25 PROCEDURE — 3331090002 HH PPS REVENUE DEBIT

## 2018-02-26 PROCEDURE — 3331090002 HH PPS REVENUE DEBIT

## 2018-02-26 PROCEDURE — 3331090001 HH PPS REVENUE CREDIT

## 2018-02-27 ENCOUNTER — HOME CARE VISIT (OUTPATIENT)
Dept: SCHEDULING | Facility: HOME HEALTH | Age: 68
End: 2018-02-27
Payer: MEDICARE

## 2018-02-27 VITALS
SYSTOLIC BLOOD PRESSURE: 118 MMHG | TEMPERATURE: 98.4 F | HEART RATE: 77 BPM | OXYGEN SATURATION: 98 % | DIASTOLIC BLOOD PRESSURE: 68 MMHG

## 2018-02-27 PROCEDURE — G0157 HHC PT ASSISTANT EA 15: HCPCS

## 2018-02-27 PROCEDURE — 3331090001 HH PPS REVENUE CREDIT

## 2018-02-27 PROCEDURE — 3331090002 HH PPS REVENUE DEBIT

## 2018-02-28 PROCEDURE — 3331090002 HH PPS REVENUE DEBIT

## 2018-02-28 PROCEDURE — 3331090001 HH PPS REVENUE CREDIT

## 2018-03-01 PROCEDURE — 3331090002 HH PPS REVENUE DEBIT

## 2018-03-01 PROCEDURE — 3331090001 HH PPS REVENUE CREDIT

## 2018-03-02 ENCOUNTER — HOME CARE VISIT (OUTPATIENT)
Dept: HOME HEALTH SERVICES | Facility: HOME HEALTH | Age: 68
End: 2018-03-02
Payer: MEDICARE

## 2018-03-02 VITALS
TEMPERATURE: 98.9 F | DIASTOLIC BLOOD PRESSURE: 70 MMHG | OXYGEN SATURATION: 96 % | HEART RATE: 76 BPM | SYSTOLIC BLOOD PRESSURE: 132 MMHG

## 2018-03-02 PROCEDURE — G0157 HHC PT ASSISTANT EA 15: HCPCS

## 2018-03-02 PROCEDURE — 3331090001 HH PPS REVENUE CREDIT

## 2018-03-02 PROCEDURE — 3331090002 HH PPS REVENUE DEBIT

## 2018-03-03 PROCEDURE — 3331090002 HH PPS REVENUE DEBIT

## 2018-03-03 PROCEDURE — 3331090001 HH PPS REVENUE CREDIT

## 2018-03-04 PROCEDURE — 3331090002 HH PPS REVENUE DEBIT

## 2018-03-04 PROCEDURE — 3331090001 HH PPS REVENUE CREDIT

## 2018-03-05 PROCEDURE — 3331090002 HH PPS REVENUE DEBIT

## 2018-03-05 PROCEDURE — 3331090001 HH PPS REVENUE CREDIT

## 2018-03-06 PROCEDURE — 3331090001 HH PPS REVENUE CREDIT

## 2018-03-06 PROCEDURE — 3331090002 HH PPS REVENUE DEBIT

## 2018-03-07 ENCOUNTER — HOME CARE VISIT (OUTPATIENT)
Dept: SCHEDULING | Facility: HOME HEALTH | Age: 68
End: 2018-03-07
Payer: MEDICARE

## 2018-03-07 VITALS
SYSTOLIC BLOOD PRESSURE: 160 MMHG | OXYGEN SATURATION: 97 % | DIASTOLIC BLOOD PRESSURE: 80 MMHG | TEMPERATURE: 98.3 F | HEART RATE: 79 BPM

## 2018-03-07 PROCEDURE — 3331090002 HH PPS REVENUE DEBIT

## 2018-03-07 PROCEDURE — G0151 HHCP-SERV OF PT,EA 15 MIN: HCPCS

## 2018-03-07 PROCEDURE — 3331090001 HH PPS REVENUE CREDIT

## 2018-03-08 PROCEDURE — 3331090002 HH PPS REVENUE DEBIT

## 2018-03-08 PROCEDURE — 3331090001 HH PPS REVENUE CREDIT

## 2018-04-09 LAB
ANION GAP SERPL CALC-SCNC: 11.8 MMOL/L
BUN SERPL-MCNC: 15 MG/DL (ref 6–22)
CALCIUM SERPL-MCNC: 8.7 MG/DL (ref 8.4–10.4)
CHLORIDE SERPL-SCNC: 104 MMOL/L (ref 98–110)
CO2 SERPL-SCNC: 30 MMOL/L (ref 20–32)
CREAT SERPL-MCNC: 0.9 MG/DL (ref 0.8–1.6)
GFRAA, 66117: >60
GFRNA, 66118: >60
GLUCOSE SERPL-MCNC: 87 MG/DL (ref 70–99)
POTASSIUM SERPL-SCNC: 3.5 MMOL/L (ref 3.5–5.5)
SODIUM SERPL-SCNC: 146 MMOL/L (ref 133–145)

## 2018-04-26 ENCOUNTER — OFFICE VISIT (OUTPATIENT)
Dept: CARDIOLOGY CLINIC | Age: 68
End: 2018-04-26

## 2018-04-26 VITALS
WEIGHT: 197 LBS | DIASTOLIC BLOOD PRESSURE: 67 MMHG | HEART RATE: 78 BPM | BODY MASS INDEX: 25.28 KG/M2 | SYSTOLIC BLOOD PRESSURE: 127 MMHG | HEIGHT: 74 IN

## 2018-04-26 DIAGNOSIS — I10 ESSENTIAL HYPERTENSION, BENIGN: ICD-10-CM

## 2018-04-26 DIAGNOSIS — I95.1 ORTHOSTATIC HYPOTENSION: ICD-10-CM

## 2018-04-26 DIAGNOSIS — I25.10 ATHEROSCLEROSIS OF NATIVE CORONARY ARTERY OF NATIVE HEART WITHOUT ANGINA PECTORIS: Primary | ICD-10-CM

## 2018-04-26 DIAGNOSIS — E78.5 HYPERLIPIDEMIA, UNSPECIFIED HYPERLIPIDEMIA TYPE: ICD-10-CM

## 2018-04-26 DIAGNOSIS — Z98.61 POSTSURGICAL PERCUTANEOUS TRANSLUMINAL CORONARY ANGIOPLASTY STATUS: ICD-10-CM

## 2018-04-26 NOTE — MR AVS SNAPSHOT
[address] [facility] 
 
 
 [address] [facility] 
[phone] Patient: Santosh Garcia MRN: [id number] QTT:1[birth year]26 Visit Information Date & Time Provider Department Dept. Phone Encounter #  
 4/26/2018 11:15 AM Zoraida Lanza MD [facility] [phone] Follow-up Instructions Return in about 6 months (around 10/26/2018). Your Appointments 4/26/2018 11:15 AM  
Follow Up with Zoraida Lanza MD  
[facility] [place] ([address] [facility]) Appt Note: 6 month follow up; 6 month follow up  
 [address] [facility] [address]  
  
   
 [address] [facility] [address] Upcoming Health Maintenance Date Due Hepatitis C Screening 1950 FOBT Q 1 YEAR AGE 50-75 7/7/2000 ZOSTER VACCINE AGE 60> 5/7/2010 GLAUCOMA SCREENING Q2Y 7/7/2015 Pneumococcal 65+ Low/Medium Risk (1 of 2 - PCV13) 7/7/2015 Influenza Age 5 to Adult 8/1/2017 MEDICARE YEARLY EXAM 3/14/2018 DTaP/Tdap/Td series (2 - Td) 9/6/2022 Allergies as of 4/26/2018  Review Complete On: 4/26/2018 By: Zoraida Lanza MD  
  
 Severity Noted Reaction Type Reactions Codeine  09/06/2012    Other (comments) States keeps him awake Propoxyphene  11/02/2010    Other (comments) Current Immunizations  Never Reviewed Name Date TDAP Vaccine 9/6/2012 11:06 AM  
  
 Not reviewed this visit You Were Diagnosed With   
  
 Codes Comments Atherosclerosis of native coronary artery of native heart without angina pectoris    -  Primary ICD-10-CM: I25.10 ICD-9-CM: 414.01 stable Essential hypertension, benign     ICD-10-CM: I10 
ICD-9-CM: 401.1 controlled Hyperlipidemia, unspecified hyperlipidemia type     ICD-10-CM: E78.5 ICD-9-CM: 272.4 on statin 
lab with pcp Postsurgical percutaneous transluminal coronary angioplasty status     ICD-10-CM: Z98.61 ICD-9-CM: V45.82 stable Orthostatic hypotension     ICD-10-CM: I95.1 ICD-9-CM: 458.0 stable Vitals BP Pulse Height(growth percentile) Weight(growth percentile) BMI Smoking Status 127/67 78 6' 2\" (1.88 m) 197 lb (89.4 kg) 25.29 kg/m2 Former Smoker Vitals History BMI and BSA Data Body Mass Index Body Surface Area  
 25.29 kg/m 2 2.16 m 2 Preferred Pharmacy Pharmacy Name Phone [facility]/PHARMACY #[id number] [address] Your Updated Medication List  
  
   
This list is accurate as of 4/26/18 10:56 AM.  Always use your most recent med list.  
  
  
  
  
 acetaminophen 325 mg tablet Commonly known as:  TYLENOL Take 650 mg by mouth every four (4) hours as needed for Pain. amantadine HCl 100 mg capsule Commonly known as:  Jayjay Mano Take  by mouth two (2) times a day. aspirin 325 mg tablet Commonly known as:  ASPIRIN Take 81 mg by mouth daily. ATIVAN 1 mg tablet Generic drug:  LORazepam  
Take 2 mg by mouth every eight (8) hours as needed. atorvastatin 80 mg tablet Commonly known as:  LIPITOR Take 40 mg by mouth daily. AVODART 0.5 mg capsule Generic drug:  dutasteride Take 0.5 mg by mouth daily. carbidopa-levodopa-entacapone 37.5-150-200 mg tablet Commonly known as:  STALEVO Take 1 Tab by mouth four (4) times daily. -200  
  
 cephALEXin 500 mg capsule Commonly known as:  Jaci Rise Take 500 mg by mouth two (2) times a day. clopidogrel 75 mg Tab Commonly known as:  PLAVIX TAKE 1 TABLET BY MOUTH EVERY DAY Comp. Stocking,Thigh,Reg,Medium Misc  
1 Units by Does Not Apply route daily. fish oil-omega-3 fatty acids 340-1,000 mg capsule Take 1 Cap by mouth daily. fludrocortisone 0.1 mg tablet Commonly known as:  FLORINEF Take 1 Tab by mouth two (2) times a day. lisinopril 20 mg tablet Commonly known as:  Helon Estrin Take 1 Tab by mouth daily. melatonin 10 mg Tab Take 10 mg by mouth daily. NEURONTIN 300 mg capsule Generic drug:  gabapentin Take 300 mg by mouth three (3) times daily. nitroglycerin 0.4 mg SL tablet Commonly known as:  NITROSTAT  
1 Tab by SubLINGual route every five (5) minutes as needed for Chest Pain for up to 3 doses. ONE-A-DAY MEN'S PO Take 1 Tab by mouth daily. REMERON 30 mg tablet Generic drug:  mirtazapine Take 30 mg by mouth nightly. ZOLOFT 50 mg tablet Generic drug:  sertraline Take  by mouth two (2) times a day. Follow-up Instructions Return in about 6 months (around 10/26/2018). Introducing [facility]! Shemar Hein introduces [employer] patient portal. Now you can access parts of your medical record, email your doctor's office, and request medication refills online. 1. In your internet browser, go to [email or web address] 2. Click on the First Time User? Click Here link in the Sign In box. You will see the New Member Sign Up page. 3. Enter your [employer] Access Code exactly as it appears below. You will not need to use this code after youve completed the sign-up process. If you do not sign up before the expiration date, you must request a new code. · [employer] Access Code: [email or web address] Expires: 5/7/2018  3:31 PM 
 
4. Enter the last four digits of your Social Security Number (xxxx) and Date of Birth (mm/dd/yyyy) as indicated and click Submit. You will be taken to the next sign-up page. 5. Create a [email or web address].comt ID. This will be your [employer] login ID and cannot be changed, so think of one that is secure and easy to remember. 6. Create a [employer] password. You can change your password at any time. 7. Enter your Password Reset Question and Answer. This can be used at a later time if you forget your password. 8. Enter your e-mail address. You will receive e-mail notification when new information is available in [address] 9. Click Sign Up. You can now view and download portions of your medical record. 10. Click the Download Summary menu link to download a portable copy of your medical information. If you have questions, please visit the Frequently Asked Questions section of the [employer] website. Remember, [employer] is NOT to be used for urgent needs. For medical emergencies, dial 911. Now available from your iPhone and Android! Please provide this summary of care documentation to your next provider. Your primary care clinician is listed as Rizwaan Fearing. If you have any questions after today's visit, please call [phone].

## 2018-04-26 NOTE — PROGRESS NOTES
1. Have you been to the ER, urgent care clinic since your last visit? Hospitalized since your last visit?     no  2. Have you seen or consulted any other health care providers outside of the [address] [place] [address] since your last visit? Include any pap smears or colon screening. Yes Where: pcp     3. Since your last visit, have you had any of the following symptoms?      dizziness.

## 2018-04-26 NOTE — PROGRESS NOTES
HISTORY OF PRESENT ILLNESS  Елена Borja is a 79 y.o. male. HPI Comments: Patient with cad,old pci,hyperlipidemia. On follow up patient denies any chest pains, palpitation or other significant symptoms. He has sob on exertion. Improving  h/o syncope-new neurological finding  [id number]  10/2017-er visit    Hypertension   The history is provided by the patient. This is a chronic problem. The problem occurs constantly. The problem has not changed since onset. Associated symptoms include shortness of breath. Pertinent negatives include no chest pain, no abdominal pain and no headaches. Cholesterol Problem   The history is provided by the patient. This is a chronic problem. The problem occurs constantly. The problem has not changed since onset. Associated symptoms include shortness of breath. Pertinent negatives include no chest pain, no abdominal pain and no headaches. Shortness of Breath   The history is provided by the patient. This is a chronic problem. The problem occurs intermittently. The problem has been gradually improving. Pertinent negatives include no fever, no headaches, no cough, no sputum production, no hemoptysis, no wheezing, no PND, no orthopnea, no chest pain, no vomiting, no abdominal pain, no rash, no leg swelling and no claudication. Dizziness   The history is provided by the patient. This is a chronic problem. The problem occurs every several days. The problem has not changed since onset. Associated symptoms include shortness of breath. Pertinent negatives include no chest pain, no abdominal pain and no headaches. The symptoms are aggravated by exertion and standing. Nothing relieves the symptoms. Review of Systems   Constitutional: Negative for chills and fever. HENT: Negative for nosebleeds. Eyes: Negative for blurred vision and double vision. Respiratory: Positive for shortness of breath. Negative for cough, hemoptysis, sputum production and wheezing. Cardiovascular: Negative for chest pain, palpitations, orthopnea, claudication, leg swelling and PND. Gastrointestinal: Negative for abdominal pain, heartburn, nausea and vomiting. Musculoskeletal: Negative for myalgias. Skin: Negative for rash. Neurological: Positive for tingling. Negative for dizziness, loss of consciousness, weakness and headaches. Endo/Heme/Allergies: Does not bruise/bleed easily. Family History   Problem Relation Age of Onset    Heart Disease Other        Past Medical History:   Diagnosis Date    CAD (coronary artery disease)     Coronary atherosclerosis of native coronary artery 3/7/2014    stable  old pci sob on exertion. r/o ischemia or cmp     Essential hypertension, benign 3/7/2014    old pci's     Hypotension, unspecified 3/7/2014    old pci's     Other and unspecified hyperlipidemia 3/7/2014    old pci's     Parkinson disease ([address] 3/7/2014    old pci's     Parkinson's disease ([facility] [address]     Postsurgical percutaneous transluminal coronary angioplasty status 3/7/2014    old pci's        Past Surgical History:   Procedure Laterality Date    CARDIAC SURG PROCEDURE UNLIST      stents    HX CERVICAL FUSION      HX HERNIA REPAIR Left 12/3/13    [facility]    HX OTHER SURGICAL      deep brain stimulator       Social History   Substance Use Topics    Smoking status: Former Smoker     Quit date: 2/8/2016    Smokeless tobacco: Never Used    Alcohol use Yes      Comment: occasional       Allergies   Allergen Reactions    Codeine Other (comments)     States keeps him awake    Propoxyphene Other (comments)       Current Outpatient Prescriptions   Medication Sig    MULTIVIT-MINERALS/FA/LYCOPENE (ONE-A-DAY MEN'S PO) Take 1 Tab by mouth daily.  lisinopril (PRINIVIL, ZESTRIL) 20 mg tablet Take 1 Tab by mouth daily.  aspirin (ASPIRIN) 325 mg tablet Take 81 mg by mouth daily.     nitroglycerin (NITROSTAT) 0.4 mg SL tablet 1 Tab by SubLINGual route every five (5) minutes as needed for Chest Pain for up to 3 doses.  fludrocortisone (FLORINEF) 0.1 mg tablet Take 1 Tab by mouth two (2) times a day.  cephALEXin (KEFLEX) 500 mg capsule Take 500 mg by mouth two (2) times a day.  melatonin 10 mg tab Take 10 mg by mouth daily.  fish oil-omega-3 fatty acids 340-1,000 mg capsule Take 1 Cap by mouth daily.  clopidogrel (PLAVIX) 75 mg tab TAKE 1 TABLET BY MOUTH EVERY DAY    Comp. Stocking,Thigh,Reg,Medium misc 1 Units by Does Not Apply route daily.  mirtazapine (REMERON) 30 mg tablet Take 30 mg by mouth nightly.  acetaminophen (TYLENOL) 325 mg tablet Take 650 mg by mouth every four (4) hours as needed for Pain.  sertraline (ZOLOFT) 50 mg tablet Take  by mouth two (2) times a day.  gabapentin (NEURONTIN) 300 mg capsule Take 300 mg by mouth three (3) times daily.  dutasteride (AVODART) 0.5 mg capsule Take 0.5 mg by mouth daily.  amantadine HCl (SYMMETREL) 100 mg capsule Take  by mouth two (2) times a day.  atorvastatin (LIPITOR) 80 mg tablet Take 40 mg by mouth daily.  LORazepam (ATIVAN) 1 mg tablet Take 2 mg by mouth every eight (8) hours as needed.  carbidopa-levodopa-entacapone (STALEVO) 37.5-150-200 mg tablet Take 1 Tab by mouth four (4) times daily. -200     No current facility-administered medications for this visit. Visit Vitals    /67    Pulse 78    Ht 6' 2\" (1.88 m)    Wt 89.4 kg (197 lb)    BMI 25.29 kg/m2       Physical Exam   Constitutional: He is oriented to person, place, and time. He appears well-developed and well-nourished. HENT:   Head: Normocephalic and atraumatic. Eyes: Conjunctivae are normal.   Neck: Neck supple. No JVD present. No tracheal deviation present. No thyromegaly present. Cardiovascular: Normal rate, regular rhythm and normal heart sounds. Exam reveals no gallop and no friction rub. No murmur heard. Pulmonary/Chest: Breath sounds normal. No respiratory distress. He has no wheezes.  He has no rales. He exhibits no tenderness. Brain stimulator generator on rt side   Abdominal: Soft. There is no tenderness. Musculoskeletal: He exhibits no edema. Neurological: He is alert and oriented to person, place, and time. Skin: Skin is warm and dry. Psychiatric: He has a normal mood and affect. Mr. Marcela Darling has a reminder for a \"due or due soon\" health maintenance. I have asked that he contact his primary care provider for follow-up on this health maintenance. NUCLEAR IMAGING: 3/2014  Findings:   1. Post-stress imaging in short axis, horizontal and vertical long axis views reveals a mild defect in isotope uptake along inferobasal wall. 2. Resting images also show persistent defect in inferobasal wall without any normalization. 3. Gated images show normal left ventricular size, wall motion and systolic function. Ejection fraction is 50%. Diagnosis:   1. Probably normal scan. 2. Evidence of mild fixed defect of inferobasal wall noted with normal wall motion, likely suggestive of tissue attenuation. 3.   SUMMARY:echo:3/2014  Left ventricle: The ventricle was mildly dilated. Ejection fraction was  estimated to be 55 %. No obvious wall motion abnormalities identified in  the views obtained. There was mild concentric hypertrophy. Features were  consistent with a pseudonormal left ventricular filling pattern, with  concomitant abnormal relaxation and increased filling pressure (grade 2  diastolic dysfunction). Impression:2016    1. Extensive atherosclerotic vascular disease. High grade stenoses (greater than 75%) are suggested at the origins of both vertebral arteries. 2.  Heterogeneous plaque involving both carotid bifurcations with moderate stenoses involving both internal carotid artery origins, as described above. 3.  High-grade stenoses involving the right carotid siphon with moderate to high-grade stenoses in the left carotid siphon.     4.  Probable high-grade stenosis involving the distal P2 segment of the left posterior cerebral artery. cta-2017  CTA NECK: Unchanged since 16.              SANCHEZ:  Critical intracranial atherosclerotic stenosis.  Extracranial stenosis not well characterized, perhaps 50-70%.           LICA:  High-grade intracranial atherosclerotic stenosis.   ~30-40% extracranial stenosis.           RVA:  Origin stenosis, at least moderate.              LVA:  Critical origin stenosis.           VERTEBROBASILAR COLLATERAL SUPPORT: Small bilateral posterior communicating arteries.       CTA BRAIN:  Significant right MCA change since 16.              1.  New high-grade right MCA origin stenosis.           2.  Unchanged high-grade left PCA P2 stenosis.           3.  No aneurysm or large vessel occlusion. Echo-2017   INCREASED LEFT VENTRICULAR CAVITY SIZE WITH MILD CONCENTRIC LEFT VENTRICULAR HYPERTROPHY. NORMAL GLOBAL LEFT VENTRICULAR SYSTOLIC FUNCTION WITH AN ESTIMATED EJECTION FRACTION OF   55%. MILD DIASTOLIC DYSFUNCTION. NORMAL PULMONARY ARTERY PRESSURE OF 11 MMHG. MILDLY DILATED LEFT ATRIUM. TRACE MITRAL AND TRICUSPID REGURGITATION. NO EVIDENCE OF AORTIC OR PULMONIC REGURGITATION. COMPARED WITH PREVIOUS REPORT DATED 2016, THE EJECTION FRACTION HAS INCREASED FROM 45%   TO 55%. NUCLEAR IMAGIN2017    Findings:   1. Stress images reveal normal Myoview distrubution in all the LV segments in short axis, vertical and horizontal long axis views. 2. Resting images have a normal uptake. 3. Gated images reveal normal wall motion and the ejection fraction is calculated to be 62%. Conclusion:   1. Normal perfusion scan. 2. Normal wall motion and ejection fraction. 3. Low risk scan. I Have personally reviewed recent relevant labs available and discussed with patient  2017-lipid,lft  I have personally reviewed patient's records available from hospital and other providers and incorporated findings in patient care.     Assessment ICD-10-CM ICD-9-CM    1. Atherosclerosis of native coronary artery of native heart without angina pectoris I25.10 414.01     stable   2. Essential hypertension, benign I10 401.1     controlled   3. Hyperlipidemia, unspecified hyperlipidemia type E78.5 272.4     on statin  lab with pcp   4. Postsurgical percutaneous transluminal coronary angioplasty status Z98.61 V45.82     stable   5. Orthostatic hypotension I95.1 458.0     stable   will keep bp on high side due to frequent drop-advised florinef daily  Advised to use lisinopril 20 mg a day instead of 40 mg-10/2017        No orders of the defined types were placed in this encounter. Follow-up Disposition:  Return in about 6 months (around 10/26/2018).

## 2018-05-01 DIAGNOSIS — I10 ESSENTIAL HYPERTENSION, BENIGN: ICD-10-CM

## 2018-05-01 RX ORDER — LISINOPRIL 20 MG/1
20 TABLET ORAL DAILY
Qty: 90 TAB | Refills: 3 | Status: SHIPPED | OUTPATIENT
Start: 2018-05-01 | End: 2018-05-03 | Stop reason: SDUPTHER

## 2018-05-03 DIAGNOSIS — I10 ESSENTIAL HYPERTENSION, BENIGN: ICD-10-CM

## 2018-05-03 RX ORDER — LISINOPRIL 20 MG/1
20 TABLET ORAL DAILY
Qty: 90 TAB | Refills: 3 | Status: SHIPPED | OUTPATIENT
Start: 2018-05-03 | End: 2018-09-11 | Stop reason: SDUPTHER

## 2018-06-18 RX ORDER — FLUDROCORTISONE ACETATE 0.1 MG/1
TABLET ORAL
Qty: 60 TAB | Refills: 6 | Status: SHIPPED | OUTPATIENT
Start: 2018-06-18 | End: 2018-10-16 | Stop reason: SDUPTHER

## 2018-06-26 ENCOUNTER — OFFICE VISIT (OUTPATIENT)
Dept: ORTHOPEDIC SURGERY | Age: 68
End: 2018-06-26

## 2018-06-26 VITALS
OXYGEN SATURATION: 99 % | HEIGHT: 74 IN | BODY MASS INDEX: 24.72 KG/M2 | WEIGHT: 192.6 LBS | TEMPERATURE: 96.3 F | DIASTOLIC BLOOD PRESSURE: 58 MMHG | HEART RATE: 73 BPM | RESPIRATION RATE: 16 BRPM | SYSTOLIC BLOOD PRESSURE: 120 MMHG

## 2018-06-26 DIAGNOSIS — M25.522 LEFT ELBOW PAIN: Primary | ICD-10-CM

## 2018-06-26 DIAGNOSIS — M70.22 OLECRANON BURSITIS OF LEFT ELBOW: ICD-10-CM

## 2018-06-26 RX ORDER — TRIAMCINOLONE ACETONIDE 40 MG/ML
40 INJECTION, SUSPENSION INTRA-ARTICULAR; INTRAMUSCULAR ONCE
Qty: 1 ML | Refills: 0
Start: 2018-06-26 | End: 2018-06-26

## 2018-06-26 NOTE — PROGRESS NOTES
HISTORY OF PRESENT ILLNESS:  Santosh Garcia is a 40-year-old gentleman with a history of Parkinson disease. He says he has noticed some pain and swelling in his left elbow  for about the past week. He has been falling a lot because of his Parkinson disease. He does not necessarily remember, however, hitting his left elbow. He denies numbness or tingling in his left upper extremity. He has good motion of the left elbow and is fully weight-bearing in the left elbow through his rolling walker. PHYSICAL EXAMINATION:  Clinical examination today reveals a 40-year-old gentleman with Parkinson disease. He does rely on a roll aide for ambulation assistance. With reference to his left elbow, he does have evidence of an olecranon bursitis. There is no erythema or increased warmth. He has good full range of motion of the left elbow without significant discomfort. There is minimal tenderness over this area. There is good pronation and supination of the left forearm without discomfort. He does not have palpable tenderness over the radial head. Neurovascular testing intact in the left upper extremity. RADIOGRAPHS: His x-rays reveal no acute osseous pathology. IMPRESSION:  Left olecranon bursitis. RECOMMENDATIONS:  I have recommended aspiration and cortisone injection in this left olecranon bursitis. Under sterile technique, I aspirated the left olecranon area with aspirate of about 20 mL of serosanguineous fluid. I did inject 1 mg of Kenalog. He tolerated the procedure well. I applied a compression dressing afterwards. I have discussed with him, this is probably from one of his falls. I would like to check him again in about a couple of weeks just to make sure that this completely dissipated. If he has any problems in the meantime, he is to notify me. All of his questions were answered today.                    Vitals:    06/26/18 0935   BP: 120/58   Pulse: 73   Resp: 16   Temp: 96.3 °F (35.7 °C)   TempSrc: Oral   SpO2: 99%   Weight: 192 lb 9.6 oz (87.4 kg)   Height: 6' 2\" (1.88 m)   PainSc:   1   PainLoc: Elbow       Patient Active Problem List   Diagnosis Code    Coronary atherosclerosis of native coronary artery I25.10    Postsurgical percutaneous transluminal coronary angioplasty status Z98.61    Essential hypertension, benign I10    Parkinson disease ([facility] [address] G20    Hyperlipidemia E78.5    Hypotension, unspecified I95.9    Vertebral artery stenosis I65.09    Orthostatic hypotension I95.1    Atherosclerosis of native artery of both lower extremities with intermittent claudication ([address] I70.213     Patient Active Problem List    Diagnosis Date Noted    Atherosclerosis of native artery of both lower extremities with intermittent claudication ([facility] [address] 01/19/2017    Vertebral artery stenosis 07/08/2016    Orthostatic hypotension 07/08/2016    Coronary atherosclerosis of native coronary artery 03/07/2014    Postsurgical percutaneous transluminal coronary angioplasty status 03/07/2014    Essential hypertension, benign 03/07/2014    Parkinson disease ([facility] [address] 03/07/2014    Hyperlipidemia 03/07/2014    Hypotension, unspecified 03/07/2014     Current Outpatient Prescriptions   Medication Sig Dispense Refill    fludrocortisone (FLORINEF) 0.1 mg tablet TAKE 1 TAB BY MOUTH TWO (2) TIMES A DAY. 60 Tab 6    lisinopril (PRINIVIL, ZESTRIL) 20 mg tablet Take 1 Tab by mouth daily. 90 Tab 3    MULTIVIT-MINERALS/FA/LYCOPENE (ONE-A-DAY MEN'S PO) Take 1 Tab by mouth daily.  aspirin (ASPIRIN) 325 mg tablet Take 81 mg by mouth daily.  fludrocortisone (FLORINEF) 0.1 mg tablet Take 1 Tab by mouth two (2) times a day. 180 Tab 3    cephALEXin (KEFLEX) 500 mg capsule Take 500 mg by mouth two (2) times a day.  melatonin 10 mg tab Take 10 mg by mouth daily.  fish oil-omega-3 fatty acids 340-1,000 mg capsule Take 1 Cap by mouth daily.       clopidogrel (PLAVIX) 75 mg tab TAKE 1 TABLET BY MOUTH EVERY DAY 30 Tab 6    Comp. Stocking,Thigh,Reg,Medium misc 1 Units by Does Not Apply route daily. 1 Units 6    mirtazapine (REMERON) 30 mg tablet Take 30 mg by mouth nightly.  acetaminophen (TYLENOL) 325 mg tablet Take 650 mg by mouth every four (4) hours as needed for Pain.  sertraline (ZOLOFT) 50 mg tablet Take  by mouth two (2) times a day.  gabapentin (NEURONTIN) 300 mg capsule Take 300 mg by mouth three (3) times daily.  dutasteride (AVODART) 0.5 mg capsule Take 0.5 mg by mouth daily.  amantadine HCl (SYMMETREL) 100 mg capsule Take  by mouth two (2) times a day.  atorvastatin (LIPITOR) 80 mg tablet Take 40 mg by mouth daily.  LORazepam (ATIVAN) 1 mg tablet Take 2 mg by mouth every eight (8) hours as needed.  carbidopa-levodopa-entacapone (STALEVO) 37.5-150-200 mg tablet Take 1 Tab by mouth four (4) times daily. -200      nitroglycerin (NITROSTAT) 0.4 mg SL tablet 1 Tab by SubLINGual route every five (5) minutes as needed for Chest Pain for up to 3 doses. 30 Tab 0     Allergies   Allergen Reactions    Codeine Other (comments)     States keeps him awake    Propoxyphene Other (comments)     Past Medical History:   Diagnosis Date    CAD (coronary artery disease)     Coronary atherosclerosis of native coronary artery 3/7/2014    stable  old pci sob on exertion. r/o ischemia or cmp     Essential hypertension, benign 3/7/2014    old pci's     Hypotension, unspecified 3/7/2014    old pci's     Other and unspecified hyperlipidemia 3/7/2014    old pci's     Parkinson disease ([facility] [address] 3/7/2014    old pci's     Parkinson's disease ([facility] [address]     Postsurgical percutaneous transluminal coronary angioplasty status 3/7/2014    old pci's      Past Surgical History:   Procedure Laterality Date    CARDIAC SURG PROCEDURE UNLIST      stents    HX CERVICAL FUSION      HX HERNIA REPAIR Left 12/3/13    [facility]    HX OTHER SURGICAL      deep brain stimulator     Family History   Problem Relation Age of Onset    Heart Disease Other      Social History   Substance Use Topics    Smoking status: Former Smoker     Quit date: 2/8/2016    Smokeless tobacco: Never Used    Alcohol use Yes      Comment: occasional

## 2018-07-10 ENCOUNTER — OFFICE VISIT (OUTPATIENT)
Dept: ORTHOPEDIC SURGERY | Age: 68
End: 2018-07-10

## 2018-07-10 VITALS
HEART RATE: 75 BPM | DIASTOLIC BLOOD PRESSURE: 78 MMHG | WEIGHT: 192 LBS | BODY MASS INDEX: 24.64 KG/M2 | HEIGHT: 74 IN | OXYGEN SATURATION: 97 % | SYSTOLIC BLOOD PRESSURE: 131 MMHG

## 2018-07-10 DIAGNOSIS — M70.22 OLECRANON BURSITIS OF LEFT ELBOW: Primary | ICD-10-CM

## 2018-07-10 DIAGNOSIS — G20 PARKINSON'S DISEASE (HCC): ICD-10-CM

## 2018-07-10 NOTE — PROGRESS NOTES
HISTORY OF PRESENT ILLNESS:  Garcia John is here for followup with reference to his left elbow. On June 26, 2018, I aspirated his left elbow olecranon bursitis, which looked a little bit inflamed with a possible small area of cellulitis. He has done very well and has no further complaints of pain to the left elbow. He notes occasionally when he rests it on a table, he has some discomfort. PHYSICAL EXAMINATION:   Clinical examination today reveals a gentleman using a roll aid who ambulates with a neurologic gait. He has a high risk for fall, and he does have multiple bruises on him. With reference to his left elbow, he has no recurrence of the left olecranon bursitis. There is no evidence of infection or cellulitis. He has full range of motion of the left elbow without discomfort. There is no palpable tenderness over the left olecranon bursa. Neurologic testing is intact to the left upper extremity. IMPRESSION:  Resolved left olecranon bursitis. RECOMMENDATIONS:  I have recommended if he has some discomfort when he rests his elbow on a table he should use an elbow pad. I will discuss this with his wife. If he has recurrent pain in either elbow, they are to notify me. All of their questions were answered today. He will return to see me on an as-needed basis.                   Vitals:    07/10/18 1430   BP: 131/78   Pulse: 75   SpO2: 97%   Weight: 192 lb (87.1 kg)   Height: 6' 2\" (1.88 m)   PainSc:   0 - No pain   PainLoc: Elbow       Patient Active Problem List   Diagnosis Code    Coronary atherosclerosis of native coronary artery I25.10    Postsurgical percutaneous transluminal coronary angioplasty status Z98.61    Essential hypertension, benign I10    Parkinson disease ([facility] [address] G20    Hyperlipidemia E78.5    Hypotension, unspecified I95.9    Vertebral artery stenosis I65.09    Orthostatic hypotension I95.1    Atherosclerosis of native artery of both lower extremities with intermittent claudication ([facility] I70.213     Patient Active Problem List    Diagnosis Date Noted    Atherosclerosis of native artery of both lower extremities with intermittent claudication ([facility] 01/19/2017    Vertebral artery stenosis 07/08/2016    Orthostatic hypotension 07/08/2016    Coronary atherosclerosis of native coronary artery 03/07/2014    Postsurgical percutaneous transluminal coronary angioplasty status 03/07/2014    Essential hypertension, benign 03/07/2014    Parkinson disease ([facility] 03/07/2014    Hyperlipidemia 03/07/2014    Hypotension, unspecified 03/07/2014     Current Outpatient Prescriptions   Medication Sig Dispense Refill    fludrocortisone (FLORINEF) 0.1 mg tablet TAKE 1 TAB BY MOUTH TWO (2) TIMES A DAY. 60 Tab 6    lisinopril (PRINIVIL, ZESTRIL) 20 mg tablet Take 1 Tab by mouth daily. 90 Tab 3    MULTIVIT-MINERALS/FA/LYCOPENE (ONE-A-DAY MEN'S PO) Take 1 Tab by mouth daily.  aspirin (ASPIRIN) 325 mg tablet Take 81 mg by mouth daily.  nitroglycerin (NITROSTAT) 0.4 mg SL tablet 1 Tab by SubLINGual route every five (5) minutes as needed for Chest Pain for up to 3 doses. 30 Tab 0    fludrocortisone (FLORINEF) 0.1 mg tablet Take 1 Tab by mouth two (2) times a day. 180 Tab 3    cephALEXin (KEFLEX) 500 mg capsule Take 500 mg by mouth two (2) times a day.  melatonin 10 mg tab Take 10 mg by mouth daily.  fish oil-omega-3 fatty acids 340-1,000 mg capsule Take 1 Cap by mouth daily.  clopidogrel (PLAVIX) 75 mg tab TAKE 1 TABLET BY MOUTH EVERY DAY 30 Tab 6    mirtazapine (REMERON) 30 mg tablet Take 30 mg by mouth nightly.  acetaminophen (TYLENOL) 325 mg tablet Take 650 mg by mouth every four (4) hours as needed for Pain.  sertraline (ZOLOFT) 50 mg tablet Take  by mouth two (2) times a day.  gabapentin (NEURONTIN) 300 mg capsule Take 300 mg by mouth three (3) times daily.  dutasteride (AVODART) 0.5 mg capsule Take 0.5 mg by mouth daily.       amantadine HCl (SYMMETREL) 100 mg capsule Take  by mouth two (2) times a day.  atorvastatin (LIPITOR) 80 mg tablet Take 40 mg by mouth daily.  LORazepam (ATIVAN) 1 mg tablet Take 2 mg by mouth every eight (8) hours as needed.  carbidopa-levodopa-entacapone (STALEVO) 37.5-150-200 mg tablet Take 1 Tab by mouth four (4) times daily. -200      Comp. Stocking,Thigh,Reg,Medium misc 1 Units by Does Not Apply route daily. 1 Units 6     Allergies   Allergen Reactions    Codeine Other (comments)     States keeps him awake    Propoxyphene Other (comments)     Past Medical History:   Diagnosis Date    CAD (coronary artery disease)     Coronary atherosclerosis of native coronary artery 3/7/2014    stable  old pci sob on exertion. r/o ischemia or cmp     Essential hypertension, benign 3/7/2014    old pci's     Hypotension, unspecified 3/7/2014    old pci's     Other and unspecified hyperlipidemia 3/7/2014    old pci's     Parkinson disease ([facility] [address] 3/7/2014    old pci's     Parkinson's disease ([facility] [address]     Postsurgical percutaneous transluminal coronary angioplasty status 3/7/2014    old pci's      Past Surgical History:   Procedure Laterality Date    CARDIAC SURG PROCEDURE UNLIST      stents    HX CERVICAL FUSION      HX HERNIA REPAIR Left 12/3/13    [place]    HX OTHER SURGICAL      deep brain stimulator     Family History   Problem Relation Age of Onset    Heart Disease Other      Social History   Substance Use Topics    Smoking status: Former Smoker     Quit date: 2/8/2016    Smokeless tobacco: Never Used    Alcohol use Yes      Comment: occasional

## 2018-08-02 ENCOUNTER — OFFICE VISIT (OUTPATIENT)
Dept: ORTHOPEDIC SURGERY | Age: 68
End: 2018-08-02

## 2018-08-02 VITALS
OXYGEN SATURATION: 93 % | WEIGHT: 190 LBS | SYSTOLIC BLOOD PRESSURE: 101 MMHG | DIASTOLIC BLOOD PRESSURE: 53 MMHG | HEART RATE: 167 BPM | HEIGHT: 74 IN | BODY MASS INDEX: 24.38 KG/M2

## 2018-08-02 DIAGNOSIS — M70.22 OLECRANON BURSITIS, LEFT ELBOW: Primary | ICD-10-CM

## 2018-08-02 NOTE — PROGRESS NOTES
HISTORY OF PRESENT ILLNESS:  Erich Draper is here for followup regarding his left olecranon bursitis. I last saw him on June 26, 2018. At that time, he had some sanguineous fluid in his left olecranon bursa. He did well for a while but just recently over the past few days has noticed some recurrence of this swelling. He is in a wheelchair a lot. He does not do a lot of walking. He does rest his elbows on chairs a lot. He does not notice significant pain. PHYSICAL EXAMINATION:   Clinical examination today reveals evidence of olecranon bursitis. There is no evidence for infection. He has a good functional range of motion of the left elbow without discomfort. There is no erythema or increased warmth. Neurovascular testing is intact to the left upper extremity. IMPRESSION:  Left olecranon bursitis. RECOMMENDATIONS:  Under sterile technique, I aspirated the left elbow olecranon bursa of about 15 cc of sanguineous fluid. I then injected it with 1 mg of Kenalog. He tolerated the procedure well. I did wrap this with an Ace wrap today, and we are going to get him some elbow pads to try to prevent some recurrent trauma to these elbows. If this recurs, they are to notify me, and we will get the elbow pads to him. All of their questions were answered today.                      Vitals:    08/02/18 1408   BP: 101/53   Pulse: (!) 167   SpO2: 93%   Weight: 190 lb (86.2 kg)   Height: 6' 2\" (1.88 m)   PainSc:   6   PainLoc: Elbow       Patient Active Problem List   Diagnosis Code    Coronary atherosclerosis of native coronary artery I25.10    Postsurgical percutaneous transluminal coronary angioplasty status Z98.61    Essential hypertension, benign I10    Parkinson disease ([facility] [address] G20    Hyperlipidemia E78.5    Hypotension, unspecified I95.9    Vertebral artery stenosis I65.09    Orthostatic hypotension I95.1    Atherosclerosis of native artery of both lower extremities with intermittent claudication [facility]) I70.213     Patient Active Problem List    Diagnosis Date Noted    Atherosclerosis of native artery of both lower extremities with intermittent claudication ([facility] [address] 01/19/2017    Vertebral artery stenosis 07/08/2016    Orthostatic hypotension 07/08/2016    Coronary atherosclerosis of native coronary artery 03/07/2014    Postsurgical percutaneous transluminal coronary angioplasty status 03/07/2014    Essential hypertension, benign 03/07/2014    Parkinson disease ([facility] [address] 03/07/2014    Hyperlipidemia 03/07/2014    Hypotension, unspecified 03/07/2014     Current Outpatient Prescriptions   Medication Sig Dispense Refill    triamcinolone acetonide (KENALOG) 10 mg/mL injection 1 mL by IntraMUSCular route once for 1 dose. 1 mL 0    fludrocortisone (FLORINEF) 0.1 mg tablet TAKE 1 TAB BY MOUTH TWO (2) TIMES A DAY. 60 Tab 6    lisinopril (PRINIVIL, ZESTRIL) 20 mg tablet Take 1 Tab by mouth daily. 90 Tab 3    MULTIVIT-MINERALS/FA/LYCOPENE (ONE-A-DAY MEN'S PO) Take 1 Tab by mouth daily.  aspirin (ASPIRIN) 325 mg tablet Take 81 mg by mouth daily.  nitroglycerin (NITROSTAT) 0.4 mg SL tablet 1 Tab by SubLINGual route every five (5) minutes as needed for Chest Pain for up to 3 doses. 30 Tab 0    fludrocortisone (FLORINEF) 0.1 mg tablet Take 1 Tab by mouth two (2) times a day. 180 Tab 3    cephALEXin (KEFLEX) 500 mg capsule Take 500 mg by mouth two (2) times a day.  melatonin 10 mg tab Take 10 mg by mouth daily.  fish oil-omega-3 fatty acids 340-1,000 mg capsule Take 1 Cap by mouth daily.  clopidogrel (PLAVIX) 75 mg tab TAKE 1 TABLET BY MOUTH EVERY DAY 30 Tab 6    Comp. Stocking,Thigh,Reg,Medium misc 1 Units by Does Not Apply route daily. 1 Units 6    mirtazapine (REMERON) 30 mg tablet Take 30 mg by mouth nightly.  acetaminophen (TYLENOL) 325 mg tablet Take 650 mg by mouth every four (4) hours as needed for Pain.       sertraline (ZOLOFT) 50 mg tablet Take  by mouth two (2) times a day.  gabapentin (NEURONTIN) 300 mg capsule Take 300 mg by mouth three (3) times daily.  dutasteride (AVODART) 0.5 mg capsule Take 0.5 mg by mouth daily.  amantadine HCl (SYMMETREL) 100 mg capsule Take  by mouth two (2) times a day.  atorvastatin (LIPITOR) 80 mg tablet Take 40 mg by mouth daily.  LORazepam (ATIVAN) 1 mg tablet Take 2 mg by mouth every eight (8) hours as needed.  carbidopa-levodopa-entacapone (STALEVO) 37.5-150-200 mg tablet Take 1 Tab by mouth four (4) times daily. -200       Allergies   Allergen Reactions    Codeine Other (comments)     States keeps him awake    Propoxyphene Other (comments)     Past Medical History:   Diagnosis Date    CAD (coronary artery disease)     Coronary atherosclerosis of native coronary artery 3/7/2014    stable  old pci sob on exertion. r/o ischemia or cmp     Essential hypertension, benign 3/7/2014    old pci's     Hypotension, unspecified 3/7/2014    old pci's     Other and unspecified hyperlipidemia 3/7/2014    old pci's     Parkinson disease ([facility] [address] 3/7/2014    old pci's     Parkinson's disease ([facility] [address]     Postsurgical percutaneous transluminal coronary angioplasty status 3/7/2014    old pci's      Past Surgical History:   Procedure Laterality Date    CARDIAC SURG PROCEDURE UNLIST      stents    HX CERVICAL FUSION      HX HERNIA REPAIR Left 12/3/13    Riblet    HX OTHER SURGICAL      deep brain stimulator     Family History   Problem Relation Age of Onset    Heart Disease Other      Social History   Substance Use Topics    Smoking status: Former Smoker     Quit date: 2/8/2016    Smokeless tobacco: Never Used    Alcohol use Yes      Comment: occasional

## 2018-09-11 DIAGNOSIS — I10 ESSENTIAL HYPERTENSION, BENIGN: ICD-10-CM

## 2018-09-11 RX ORDER — LISINOPRIL 20 MG/1
20 TABLET ORAL DAILY
Qty: 90 TAB | Refills: 3 | Status: SHIPPED | OUTPATIENT
Start: 2018-09-11 | End: 2019-09-25 | Stop reason: SDUPTHER

## 2018-09-11 NOTE — TELEPHONE ENCOUNTER
Requested Prescriptions     Pending Prescriptions Disp Refills    lisinopril (PRINIVIL, ZESTRIL) 20 mg tablet 90 Tab 3     Sig: Take 1 Tab by mouth daily.

## 2018-10-16 ENCOUNTER — OFFICE VISIT (OUTPATIENT)
Dept: CARDIOLOGY CLINIC | Age: 68
End: 2018-10-16

## 2018-10-16 VITALS
HEIGHT: 74 IN | DIASTOLIC BLOOD PRESSURE: 71 MMHG | SYSTOLIC BLOOD PRESSURE: 148 MMHG | HEART RATE: 74 BPM | WEIGHT: 193 LBS | BODY MASS INDEX: 24.77 KG/M2

## 2018-10-16 DIAGNOSIS — I95.1 ORTHOSTATIC HYPOTENSION: ICD-10-CM

## 2018-10-16 DIAGNOSIS — I10 ESSENTIAL HYPERTENSION, BENIGN: ICD-10-CM

## 2018-10-16 DIAGNOSIS — E78.5 HYPERLIPIDEMIA, UNSPECIFIED HYPERLIPIDEMIA TYPE: ICD-10-CM

## 2018-10-16 DIAGNOSIS — Z98.61 POSTSURGICAL PERCUTANEOUS TRANSLUMINAL CORONARY ANGIOPLASTY STATUS: ICD-10-CM

## 2018-10-16 DIAGNOSIS — I25.10 ATHEROSCLEROSIS OF NATIVE CORONARY ARTERY OF NATIVE HEART WITHOUT ANGINA PECTORIS: Primary | ICD-10-CM

## 2018-10-16 NOTE — MR AVS SNAPSHOT
[facility] 
 
 
 [address] [facility] 
[phone] Patient: Mali Cortés MRN: [id number] OVR:[birth year] Visit Information Date & Time Provider Department Dept. Phone Encounter #  
 10/16/2018 11:15 AM Bayron Valerio MD [facility] [id number] Follow-up Instructions Return in about 6 months (around 4/16/2019). Your Appointments 4/9/2019 11:15 AM  
Office Visit with Bayron Valerio MD  
[facility] ([address]) Appt Note: 6 month follow up  
 [address] [facility] [address]  
  
   
 [address] [id number] [address] [address] Upcoming Health Maintenance Date Due Hepatitis C Screening 1950 Shingrix Vaccine Age 50> (1 of 2) 7/7/2000 FOBT Q 1 YEAR AGE 50-75 7/7/2000 GLAUCOMA SCREENING Q2Y 7/7/2015 Pneumococcal 65+ Low/Medium Risk (2 of 2 - PCV13) 11/2/2016 MEDICARE YEARLY EXAM 3/14/2018 Influenza Age 5 to Adult 8/1/2018 DTaP/Tdap/Td series (3 - Td) 1/24/2028 Allergies as of 10/16/2018  Review Complete On: 10/16/2018 By: Tiffanie Livingston Severity Noted Reaction Type Reactions Codeine  09/06/2012    Other (comments) States keeps him awake Propoxyphene  11/02/2010    Other (comments) Current Immunizations  Never Reviewed Name Date TDAP Vaccine 9/6/2012 11:06 AM  
  
 Not reviewed this visit You Were Diagnosed With   
  
 Codes Comments Atherosclerosis of native coronary artery of native heart without angina pectoris    -  Primary ICD-10-CM: I25.10 ICD-9-CM: 414.01 Stable. Continue treatment Essential hypertension, benign     ICD-10-CM: I10 
ICD-9-CM: 401.1 Mildly elevated systolic pressure. Continue to monitor Hyperlipidemia, unspecified hyperlipidemia type     ICD-10-CM: E78.5 ICD-9-CM: 272.4 On statin lab with PCP  
 Postsurgical percutaneous transluminal coronary angioplasty status     ICD-10-CM: Z98.61 ICD-9-CM: V45.82 Orthostatic hypotension     ICD-10-CM: I95.1 ICD-9-CM: 458.0 Stable Vitals BP Pulse Height(growth percentile) Weight(growth percentile) BMI Smoking Status 148/71 74 6' 2\" (1.88 m) 193 lb (87.5 kg) 24.78 kg/m2 Former Smoker Vitals History BMI and BSA Data Body Mass Index Body Surface Area 24.78 kg/m 2 2.14 m 2 Preferred Pharmacy Pharmacy Name Phone [id number] [address] [place] [address] Your Updated Medication List  
  
   
This list is accurate as of 10/16/18 11:46 AM.  Always use your most recent med list.  
  
  
  
  
 acetaminophen 325 mg tablet Commonly known as:  TYLENOL Take 650 mg by mouth every four (4) hours as needed for Pain. amantadine HCl 100 mg capsule Commonly known as:  [place]ma [place] Take  by mouth two (2) times a day. aspirin 325 mg tablet Commonly known as:  ASPIRIN Take 81 mg by mouth daily. ATIVAN 1 mg tablet Generic drug:  LORazepam  
Take 2 mg by mouth every eight (8) hours as needed. atorvastatin 80 mg tablet Commonly known as:  LIPITOR Take 40 mg by mouth daily. AVODART 0.5 mg capsule Generic drug:  dutasteride Take 0.5 mg by mouth daily. carbidopa-levodopa-entacapone 37.5-150-200 mg tablet Commonly known as:  STALEVO Take 1 Tab by mouth four (4) times daily. -200  
  
 cephALEXin 500 mg capsule Commonly known as:  Karalee  Take 500 mg by mouth two (2) times a day. clopidogrel 75 mg Tab Commonly known as:  PLAVIX TAKE 1 TABLET BY MOUTH EVERY DAY Comp. Stocking,Thigh,Reg,Medium Misc  
1 Units by Does Not Apply route daily. fish oil-omega-3 fatty acids 340-1,000 mg capsule Take 1 Cap by mouth daily. fludrocortisone 0.1 mg tablet Commonly known as:  FLORINEF Take 1 Tab by mouth two (2) times a day. lisinopril 20 mg tablet Commonly known as:  [place] Take 1 Tab by mouth daily. melatonin 10 mg Tab Take 10 mg by mouth daily. NEURONTIN 300 mg capsule Generic drug:  gabapentin Take 300 mg by mouth three (3) times daily. nitroglycerin 0.4 mg SL tablet Commonly known as:  NITROSTAT  
1 Tab by SubLINGual route every five (5) minutes as needed for Chest Pain for up to 3 doses. ONE-A-DAY MEN'S PO Take 1 Tab by mouth daily. REMERON 30 mg tablet Generic drug:  mirtazapine Take 30 mg by mouth nightly. ZOLOFT 50 mg tablet Generic drug:  sertraline Take  by mouth two (2) times a day. Follow-up Instructions Return in about 6 months (around 4/16/2019). Introducing [facility]! [facility] introduces [employer] patient portal. Now you can access parts of your medical record, email your doctor's office, and request medication refills online. 1. In your internet browser, go to [email or web address] 2. Click on the First Time User? Click Here link in the Sign In box. You will see the New Member Sign Up page. 3. Enter your [employer] Access Code exactly as it appears below. You will not need to use this code after youve completed the sign-up process. If you do not sign up before the expiration date, you must request a new code. · [employer] Access Code: [email or web address] Expires: 1/14/2019 11:04 AM 
 
4. Enter the last four digits of your Social Security Number (xxxx) and Date of Birth (mm/dd/yyyy) as indicated and click Submit. You will be taken to the next sign-up page. 5. Create a [employer] ID. This will be your [employer] login ID and cannot be changed, so think of one that is secure and easy to remember. 6. Create a [employer] password. You can change your password at any time. 7. Enter your Password Reset Question and Answer. This can be used at a later time if you forget your password. 8. Enter your e-mail address. You will receive e-mail notification when new information is available in [address] 9. Click Sign Up. You can now view and download portions of your medical record. 10. Click the Download Summary menu link to download a portable copy of your medical information. If you have questions, please visit the Frequently Asked Questions section of the [employer] website. Remember, [employer] is NOT to be used for urgent needs. For medical emergencies, dial 911. Now available from your iPhone and Android! Please provide this summary of care documentation to your next provider. Your primary care clinician is listed as Tea Coyne. If you have any questions after today's visit, please call [phone].

## 2018-10-16 NOTE — PROGRESS NOTES
HISTORY OF PRESENT ILLNESS Susan Nair is a 76 y.o. male. HPI Comments: Patient with cad,old pci,hyperlipidemia. On follow up patient denies any chest pains, palpitation or other significant symptoms. He has sob on exertion. Improving 
h/o syncope-new neurological finding [id number] 
10/2017-er visit Hypertension The history is provided by the patient. This is a chronic problem. The problem occurs constantly. The problem has not changed since onset. Associated symptoms include shortness of breath. Pertinent negatives include no chest pain, no abdominal pain and no headaches. Cholesterol Problem The history is provided by the patient. This is a chronic problem. The problem occurs constantly. The problem has not changed since onset. Associated symptoms include shortness of breath. Pertinent negatives include no chest pain, no abdominal pain and no headaches. Shortness of Breath The history is provided by the patient. This is a chronic problem. The problem occurs intermittently. The problem has been gradually improving. Pertinent negatives include no fever, no headaches, no cough, no sputum production, no hemoptysis, no wheezing, no PND, no orthopnea, no chest pain, no vomiting, no abdominal pain, no rash, no leg swelling and no claudication. Dizziness The history is provided by the patient. This is a chronic problem. The problem occurs every several days. The problem has not changed since onset. Associated symptoms include shortness of breath. Pertinent negatives include no chest pain, no abdominal pain and no headaches. The symptoms are aggravated by exertion and standing. Nothing relieves the symptoms. Review of Systems Constitutional: Negative for chills and fever. HENT: Negative for nosebleeds. Eyes: Negative for blurred vision and double vision. Respiratory: Positive for shortness of breath. Negative for cough, hemoptysis, sputum production and wheezing. Cardiovascular: Negative for chest pain, palpitations, orthopnea, claudication, leg swelling and PND. Gastrointestinal: Negative for abdominal pain, heartburn, nausea and vomiting. Musculoskeletal: Negative for myalgias. Skin: Negative for rash. Neurological: Positive for tingling. Negative for dizziness, loss of consciousness, weakness and headaches. Endo/Heme/Allergies: Does not bruise/bleed easily. Family History Problem Relation Age of Onset  Heart Disease Other Past Medical History:  
Diagnosis Date  CAD (coronary artery disease)  Coronary atherosclerosis of native coronary artery 3/7/2014  
 stable  old pci sob on exertion. r/o ischemia or cmp  Essential hypertension, benign 3/7/2014  
 old pci's  Hypotension, unspecified 3/7/2014  
 old pci's  Other and unspecified hyperlipidemia 3/7/2014  
 old pci's  Parkinson disease ([address] 3/7/2014  
 old pci's  Parkinson's disease ([facility] [address]  Postsurgical percutaneous transluminal coronary angioplasty status 3/7/2014  
 old pci's Past Surgical History:  
Procedure Laterality Date  CARDIAC SURG PROCEDURE UNLIST    
 stents  HX CERVICAL FUSION    
 HX HERNIA REPAIR Left 12/3/13 Riblet  HX OTHER SURGICAL    
 deep brain stimulator Social History Substance Use Topics  Smoking status: Former Smoker Quit date: 2/8/2016  Smokeless tobacco: Never Used  Alcohol use Yes Comment: occasional  
 
 
Allergies Allergen Reactions  Codeine Other (comments) States keeps him awake  Propoxyphene Other (comments) Current Outpatient Prescriptions Medication Sig  
 lisinopril (PRINIVIL, ZESTRIL) 20 mg tablet Take 1 Tab by mouth daily.  MULTIVIT-MINERALS/FA/LYCOPENE (ONE-A-DAY MEN'S PO) Take 1 Tab by mouth daily.  aspirin (ASPIRIN) 325 mg tablet Take 81 mg by mouth daily.   
 nitroglycerin (NITROSTAT) 0.4 mg SL tablet 1 Tab by SubLINGual route every five (5) minutes as needed for Chest Pain for up to 3 doses.  fludrocortisone (FLORINEF) 0.1 mg tablet Take 1 Tab by mouth two (2) times a day.  melatonin 10 mg tab Take 10 mg by mouth daily.  fish oil-omega-3 fatty acids 340-1,000 mg capsule Take 1 Cap by mouth daily.  clopidogrel (PLAVIX) 75 mg tab TAKE 1 TABLET BY MOUTH EVERY DAY  Comp. Stocking,Thigh,Reg,Medium misc 1 Units by Does Not Apply route daily.  mirtazapine (REMERON) 30 mg tablet Take 30 mg by mouth nightly.  acetaminophen (TYLENOL) 325 mg tablet Take 650 mg by mouth every four (4) hours as needed for Pain.  sertraline (ZOLOFT) 50 mg tablet Take  by mouth two (2) times a day.  gabapentin (NEURONTIN) 300 mg capsule Take 300 mg by mouth three (3) times daily.  dutasteride (AVODART) 0.5 mg capsule Take 0.5 mg by mouth daily.  atorvastatin (LIPITOR) 80 mg tablet Take 40 mg by mouth daily.  LORazepam (ATIVAN) 1 mg tablet Take 2 mg by mouth every eight (8) hours as needed.  carbidopa-levodopa-entacapone (STALEVO) 37.5-150-200 mg tablet Take 1 Tab by mouth four (4) times daily. -200  cephALEXin (KEFLEX) 500 mg capsule Take 500 mg by mouth two (2) times a day.  amantadine HCl (SYMMETREL) 100 mg capsule Take  by mouth two (2) times a day. No current facility-administered medications for this visit. Visit Vitals  /71  Pulse 74  Ht 6' 2\" (1.88 m)  Wt 87.5 kg (193 lb)  BMI 24.78 kg/m2 Physical Exam  
Constitutional: He is oriented to person, place, and time. He appears well-developed and well-nourished. HENT:  
Head: Normocephalic and atraumatic. Eyes: Conjunctivae are normal.  
Neck: Neck supple. No JVD present. No tracheal deviation present. No thyromegaly present. Cardiovascular: Normal rate, regular rhythm and normal heart sounds. Exam reveals no gallop and no friction rub. No murmur heard. Pulmonary/Chest: Breath sounds normal. No respiratory distress. He has no wheezes. He has no rales. He exhibits no tenderness. Brain stimulator generator on rt side Abdominal: Soft. There is no tenderness. Musculoskeletal: He exhibits no edema. Neurological: He is alert and oriented to person, place, and time. Skin: Skin is warm and dry. Psychiatric: He has a normal mood and affect. Mr. Tiffanie Jay has a reminder for a \"due or due soon\" health maintenance. I have asked that he contact his primary care provider for follow-up on this health maintenance. NUCLEAR IMAGING: 3/2014 Findings: 1. Post-stress imaging in short axis, horizontal and vertical long axis views reveals a mild defect in isotope uptake along inferobasal wall. 2. Resting images also show persistent defect in inferobasal wall without any normalization. 3. Gated images show normal left ventricular size, wall motion and systolic function. Ejection fraction is 50%. Diagnosis: 1. Probably normal scan. 2. Evidence of mild fixed defect of inferobasal wall noted with normal wall motion, likely suggestive of tissue attenuation. 3.  
SUMMARY:echo:3/2014 Left ventricle: The ventricle was mildly dilated. Ejection fraction was 
estimated to be 55 %. No obvious wall motion abnormalities identified in 
the views obtained. There was mild concentric hypertrophy. Features were 
consistent with a pseudonormal left ventricular filling pattern, with 
concomitant abnormal relaxation and increased filling pressure (grade 2 
diastolic dysfunction). Nany Pillion 1. Extensive atherosclerotic vascular disease. High grade stenoses (greater than 75%) are suggested at the origins of both vertebral arteries. 2.  Heterogeneous plaque involving both carotid bifurcations with moderate stenoses involving both internal carotid artery origins, as described above.  
 
3.  High-grade stenoses involving the right carotid siphon with moderate to high-grade stenoses in the left carotid siphon. 4.  Probable high-grade stenosis involving the distal P2 segment of the left posterior cerebral artery. cta-2017 CTA NECK: Unchanged since 16. 
 
          SANCHEZ:  Critical intracranial atherosclerotic stenosis.  Extracranial stenosis not well characterized, perhaps 50-70%.           LICA:  High-grade intracranial atherosclerotic stenosis.   ~30-40% extracranial stenosis.           RVA:  Origin stenosis, at least moderate. 
 
          LVA:  Critical origin stenosis.           VERTEBROBASILAR COLLATERAL SUPPORT: Small bilateral posterior communicating arteries. 
 
   CTA BRAIN:  Significant right MCA change since 16. 
 
          1.  New high-grade right MCA origin stenosis.           2.  Unchanged high-grade left PCA P2 stenosis.           3.  No aneurysm or large vessel occlusion. Echo-2017 INCREASED LEFT VENTRICULAR CAVITY SIZE WITH MILD CONCENTRIC LEFT VENTRICULAR HYPERTROPHY. NORMAL GLOBAL LEFT VENTRICULAR SYSTOLIC FUNCTION WITH AN ESTIMATED EJECTION FRACTION OF 
 55%. MILD DIASTOLIC DYSFUNCTION. NORMAL PULMONARY ARTERY PRESSURE OF 11 MMHG. MILDLY DILATED LEFT ATRIUM. TRACE MITRAL AND TRICUSPID REGURGITATION. NO EVIDENCE OF AORTIC OR PULMONIC REGURGITATION. COMPARED WITH PREVIOUS REPORT DATED 2016, THE EJECTION FRACTION HAS INCREASED FROM 45% TO 55%. NUCLEAR IMAGIN2017 Findings: 1. Stress images reveal normal Myoview distrubution in all the LV segments in short axis, vertical and horizontal long axis views. 2. Resting images have a normal uptake. 3. Gated images reveal normal wall motion and the ejection fraction is calculated to be 62%. Conclusion: 1. Normal perfusion scan. 2. Normal wall motion and ejection fraction. 3. Low risk scan. I Have personally reviewed recent relevant labs available and discussed with patient 2017-lipid,lft 
 I have personally reviewed patient's records available from hospital and other providers and incorporated findings in patient care. Assessment ICD-10-CM ICD-9-CM 1. Atherosclerosis of native coronary artery of native heart without angina pectoris I25.10 414.01 Stable. Continue treatment 2. Essential hypertension, benign I10 401.1 Mildly elevated systolic pressure. Continue to monitor 3. Hyperlipidemia, unspecified hyperlipidemia type E78.5 272.4 On statin lab with PCP 4. Postsurgical percutaneous transluminal coronary angioplasty status Z98.61 V45.82   
5. Orthostatic hypotension I95.1 458.0 Stable  
will keep bp on high side due to frequent drop-advised florinef daily Advised to use lisinopril 20 mg a day instead of 40 mg-10/2017 No orders of the defined types were placed in this encounter. Follow-up Disposition: 
Return in about 6 months (around 4/16/2019).

## 2018-10-16 NOTE — PROGRESS NOTES
1. Have you been to the ER, urgent care clinic since your last visit? Hospitalized since your last visit? No 
 
2. Have you seen or consulted any other health care providers outside of the [address] [place] [address] since your last visit? Include any pap smears or colon screening. Yes Where: PCP 3. Since your last visit, have you had any of the following symptoms? .  
 
  
 
4. Have you had any blood work, X-rays or cardiac testing? 5. Where do you normally have your labs drawn? 6. Do you need any refills today?

## 2018-10-18 DIAGNOSIS — I25.10 ATHEROSCLEROSIS OF NATIVE CORONARY ARTERY WITHOUT ANGINA PECTORIS, UNSPECIFIED WHETHER NATIVE OR TRANSPLANTED HEART: Primary | ICD-10-CM

## 2018-10-18 RX ORDER — NITROGLYCERIN 0.4 MG/1
0.4 TABLET SUBLINGUAL
Qty: 25 TAB | Refills: 0 | Status: SHIPPED | OUTPATIENT
Start: 2018-10-18

## 2018-10-18 NOTE — TELEPHONE ENCOUNTER
Requested Prescriptions     Pending Prescriptions Disp Refills    nitroglycerin (NITROSTAT) 0.4 mg SL tablet 30 Tab 0     Si Tab by SubLINGual route every five (5) minutes as needed for Chest Pain for up to 3 doses.
Negative/Unknown

## 2019-01-18 RX ORDER — FLUDROCORTISONE ACETATE 0.1 MG/1
TABLET ORAL
Qty: 60 TAB | Refills: 6 | Status: SHIPPED | OUTPATIENT
Start: 2019-01-18

## 2019-02-01 ENCOUNTER — OFFICE VISIT (OUTPATIENT)
Dept: ORTHOPEDIC SURGERY | Age: 69
End: 2019-02-01

## 2019-02-01 VITALS
DIASTOLIC BLOOD PRESSURE: 82 MMHG | TEMPERATURE: 97.8 F | SYSTOLIC BLOOD PRESSURE: 173 MMHG | OXYGEN SATURATION: 97 % | BODY MASS INDEX: 24.78 KG/M2 | HEART RATE: 74 BPM | HEIGHT: 74 IN | RESPIRATION RATE: 17 BRPM

## 2019-02-01 DIAGNOSIS — M70.21 OLECRANON BURSITIS OF RIGHT ELBOW: Primary | ICD-10-CM

## 2019-02-01 NOTE — PROGRESS NOTES
Sandy Christy is a 76 y.o. male right handed retiree, on disability. Worker's Compensation and legal considerations: not known. Vitals:  
 02/01/19 1306 BP: 173/82 Pulse: 74 Resp: 17 Temp: 97.8 °F (36.6 °C) SpO2: 97% Height: 6' 2\" (1.88 m) PainSc:   0 - No pain PainLoc: Elbow Chief Complaint Patient presents with  Elbow Pain Right  New Patient HPI: Patient comes in today reporting elbow swelling on his right elbow. He had been seen in the past and had his elbow drained in the past for bursitis. He reports today that after he fell the same thing happened and he had a lot of swelling. He was recently treated for cervical spine fracture and is currently in a halo device. He denies any redness or pain in the area of swelling. He denies any fever chills or night sweats. Date of onset:5/2018 Injury: Yes: Comment: Fall but no injury to the elbow only neck. Prior Treatment:  No 
 
Numbness/ Tingling: No 
 
ROS: Review of Systems - General ROS: negative Respiratory ROS: no cough, shortness of breath, or wheezing Cardiovascular ROS: no chest pain or dyspnea on exertion Musculoskeletal ROS: positive for - swelling in elbow - right Neurological ROS: negative Dermatological ROS: negative Past Medical History:  
Diagnosis Date  CAD (coronary artery disease)  Coronary atherosclerosis of native coronary artery 3/7/2014  
 stable  old pci sob on exertion. r/o ischemia or cmp  Essential hypertension, benign 3/7/2014  
 old pci's  Hypotension, unspecified 3/7/2014  
 old pci's  Other and unspecified hyperlipidemia 3/7/2014  
 old pci's  Parkinson disease ([address] 3/7/2014  
 old pci's  Parkinson's [address] [address]  Postsurgical percutaneous transluminal coronary angioplasty status 3/7/2014  
 old pci's Past Surgical History:  
Procedure Laterality Date  CARDIAC SURG PROCEDURE UNLIST    
 stents  HX CERVICAL FUSION    
  HX HERNIA REPAIR Left 12/3/13 [place]  HX OTHER SURGICAL    
 deep brain stimulator Current Outpatient Medications Medication Sig Dispense Refill  nitroglycerin (NITROSTAT) 0.4 mg SL tablet 1 Tab by SubLINGual route every five (5) minutes as needed for Chest Pain for up to 3 doses. 25 Tab 0  
 lisinopril (PRINIVIL, ZESTRIL) 20 mg tablet Take 1 Tab by mouth daily. 90 Tab 3  MULTIVIT-MINERALS/FA/LYCOPENE (ONE-A-DAY MEN'S PO) Take 1 Tab by mouth daily.  aspirin (ASPIRIN) 325 mg tablet Take 81 mg by mouth daily.  fludrocortisone (FLORINEF) 0.1 mg tablet Take 1 Tab by mouth two (2) times a day. 180 Tab 3  cephALEXin (KEFLEX) 500 mg capsule Take 500 mg by mouth two (2) times a day.  melatonin 10 mg tab Take 10 mg by mouth daily.  fish oil-omega-3 fatty acids 340-1,000 mg capsule Take 1 Cap by mouth daily.  clopidogrel (PLAVIX) 75 mg tab TAKE 1 TABLET BY MOUTH EVERY DAY 30 Tab 6  Comp. Stocking,Thigh,Reg,Medium misc 1 Units by Does Not Apply route daily. 1 Units 6  
 acetaminophen (TYLENOL) 325 mg tablet Take 650 mg by mouth every four (4) hours as needed for Pain.  sertraline (ZOLOFT) 50 mg tablet Take  by mouth two (2) times a day.  gabapentin (NEURONTIN) 300 mg capsule Take 300 mg by mouth three (3) times daily.  dutasteride (AVODART) 0.5 mg capsule Take 0.5 mg by mouth daily.  amantadine HCl (SYMMETREL) 100 mg capsule Take  by mouth two (2) times a day.  atorvastatin (LIPITOR) 80 mg tablet Take 40 mg by mouth daily.  LORazepam (ATIVAN) 1 mg tablet Take 2 mg by mouth every eight (8) hours as needed.  carbidopa-levodopa-entacapone (STALEVO) 37.5-150-200 mg tablet Take 1 Tab by mouth four (4) times daily. -200  fludrocortisone (FLORINEF) 0.1 mg tablet TAKE 1 TABLET BY MOUTH TWO TIMES A DAY 60 Tab 6  
 mirtazapine (REMERON) 30 mg tablet Take 30 mg by mouth nightly. Allergies Allergen Reactions  Codeine Other (comments) States keeps him awake  Propoxyphene Other (comments) PE:  
 
Right Elbow: There is a olecranon bursitis about the right elbow. There is significant swelling in this area but no erythema edema or warmth. The area is nontender to palpation. Imaging: None indicated today ICD-10-CM ICD-9-CM 1. Olecranon bursitis of right elbow M70.21 726.33 Plan:  
 
Olecranon bursa aspiration today. Remove Band-Aid later today and ice elbow as needed. No further treatment needed for the right elbow at this time. Follow-up PRN 
 
[facility] - [facility] PROCEDURE PROGRESS NOTE Chart reviewed for the following: 
 Royce LAZO DO, have reviewed the History, Physical and updated the Allergic reactions for [facility] TIME OUT performed immediately prior to start of procedure: 
 Royce LAZO DO, have performed the following reviews on [facility] prior to the start of the procedure: 
         
* Patient was identified by name and date of birth * Agreement on procedure being performed was verified * Risks and Benefits explained to the patient * Procedure site verified and marked as necessary * Patient was positioned for comfort * Consent was signed and verified Time: 13:25 Date of procedure: 2/1/2019 Procedure performed by: Alvaro Rush DO 
 
Provider assisted by: Joshua Damon LPN Patient assisted by: self How tolerated by patient: tolerated the procedure well with no complications Post Procedural Pain Scale: 0 - No Hurt Comments: none Procedure: After consent was obtained, using sterile technique the bursa was prepped. 25 mL of serosanguineous bloody fluid was aspirated from the bursa. .  The procedure was well tolerated. The patient is asked to continue to rest the area for a few more days before resuming regular activities.   It may be more painful for the first 1-2 days. Watch for fever, or increased swelling or persistent pain in the joint. Call or return to clinic prn if such symptoms occur or there is failure to improve as anticipated. Plan was reviewed with patient, who verbalized agreement and understanding of the plan

## 2019-09-25 DIAGNOSIS — I10 ESSENTIAL HYPERTENSION, BENIGN: ICD-10-CM

## 2019-09-25 RX ORDER — LISINOPRIL 20 MG/1
TABLET ORAL
Qty: 30 TAB | Refills: 0 | Status: SHIPPED | OUTPATIENT
Start: 2019-09-25

## 2021-12-08 NOTE — TELEPHONE ENCOUNTER
Trenia Gottron from [facility] called to report that patient has been picked up for physical therapy as of 2/12, original order was for 2/9.   Smitha's contact # is [phone] No